# Patient Record
Sex: MALE | Race: BLACK OR AFRICAN AMERICAN | NOT HISPANIC OR LATINO | Employment: UNEMPLOYED | URBAN - METROPOLITAN AREA
[De-identification: names, ages, dates, MRNs, and addresses within clinical notes are randomized per-mention and may not be internally consistent; named-entity substitution may affect disease eponyms.]

---

## 2017-01-13 ENCOUNTER — HOSPITAL ENCOUNTER (EMERGENCY)
Facility: HOSPITAL | Age: 37
Discharge: HOME | End: 2017-01-13
Payer: MEDICAID

## 2017-01-13 VITALS
RESPIRATION RATE: 18 BRPM | SYSTOLIC BLOOD PRESSURE: 143 MMHG | BODY MASS INDEX: 25.48 KG/M2 | OXYGEN SATURATION: 100 % | HEIGHT: 69 IN | DIASTOLIC BLOOD PRESSURE: 72 MMHG | TEMPERATURE: 98 F | WEIGHT: 172 LBS | HEART RATE: 77 BPM

## 2017-01-13 DIAGNOSIS — S60.00XA TRAUMATIC HEMATOMA OF FINGER, INITIAL ENCOUNTER: Primary | ICD-10-CM

## 2017-01-13 DIAGNOSIS — S69.91XA THUMB INJURY, RIGHT, INITIAL ENCOUNTER: ICD-10-CM

## 2017-01-13 PROCEDURE — 99283 EMERGENCY DEPT VISIT LOW MDM: CPT

## 2017-01-13 RX ORDER — TRAMADOL HYDROCHLORIDE 50 MG/1
50 TABLET ORAL EVERY 6 HOURS PRN
Qty: 11 TABLET | Refills: 0 | Status: SHIPPED | OUTPATIENT
Start: 2017-01-13 | End: 2017-01-23

## 2017-01-13 NOTE — ED PROVIDER NOTES
SCRIBE #1 NOTE: I, Iris Martinez, am scribing for, and in the presence of, YINA Burgess. I have scribed the entire note.      History      Chief Complaint   Patient presents with    Hand Pain     pt c/o right thumb pain for the past few days; may have injured it while using a car arabella        Review of patient's allergies indicates:  No Known Allergies     HPI   HPI    1/13/2017, 5:35 PM   History obtained from the patient      History of Present Illness: Zbigniew Cox is a 36 y.o. male patient who presents to the Emergency Department for hand pain which onset suddenly a few days ago. Pain is located to the right thumb. Pt states that pain onset after hitting his finger on a car arabella. Sx have been constant and moderate in severity. No modifying factors noted. No associated sx included. Pt denies any fever, chills, or paresthesias. No further complaints at this time.       Arrival mode: Personal vehicle      PCP: Primary Doctor No       Past Medical History:  Past Medical History   Diagnosis Date    Substance abuse        Past Surgical History:  History reviewed. No pertinent past surgical history.      Family History:  Family History   Problem Relation Age of Onset    Stroke Maternal Uncle     Cancer Maternal Grandfather        Social History:  Social History     Social History Main Topics    Smoking status: Current Every Day Smoker     Packs/day: 1.00     Years: 10.00     Types: Cigarettes    Smokeless tobacco: Never Used    Alcohol use Yes      Comment: social    Drug use: Yes     Special: Marijuana    Sexual activity: Yes     Partners: Female       ROS   Review of Systems   Constitutional: Negative for chills, diaphoresis and fever.   HENT: Negative for sore throat.    Respiratory: Negative for shortness of breath.    Cardiovascular: Negative for chest pain.   Gastrointestinal: Negative for nausea and vomiting.   Genitourinary: Negative for dysuria.   Musculoskeletal: Negative for back pain.  "       (+) right thumb pain   Skin: Negative for color change and rash.   Neurological: Negative for weakness.        (-) paresthesias    Hematological: Does not bruise/bleed easily.       Physical Exam    Initial Vitals   BP Pulse Resp Temp SpO2   01/13/17 1600 01/13/17 1600 01/13/17 1600 01/13/17 1600 01/13/17 1600   143/72 77 18 97.9 °F (36.6 °C) 100 %      Physical Exam  Nursing Notes and Vital Signs Reviewed.  Constitutional: Patient is in no acute distress. Awake and alert. Well-developed and well-nourished.  Head: Atraumatic. Normocephalic.  Eyes: PERRL. EOM intact. Conjunctivae are not pale. No scleral icterus.  ENT: Mucous membranes are moist. Oropharynx is clear and symmetric.    Neck: Supple. Full ROM. No lymphadenopathy.  Cardiovascular: Regular rate. Regular rhythm. No murmurs, rubs, or gallops. Distal pulses are 2+ and symmetric.  Pulmonary/Chest: No respiratory distress. Clear to auscultation bilaterally. No wheezing, rales, or rhonchi.  Musculoskeletal: Moves all extremities. No obvious deformities. TTP at the right 1st MCP with notable swelling. Good capillary refill. Limited ROM to right thumb.   Skin: Warm and dry.  Neurological:  Alert, awake, and appropriate. 3/5 strength in right thumb. Normal speech.  No acute focal neurological deficits are appreciated.  Psychiatric: Normal affect. Good eye contact. Appropriate in content.      ED Course    Procedures  ED Vital Signs:  Vitals:    01/13/17 1600   BP: (!) 143/72   Pulse: 77   Resp: 18   Temp: 97.9 °F (36.6 °C)   TempSrc: Oral   SpO2: 100%   Weight: 78 kg (172 lb)   Height: 5' 9" (1.753 m)           Imaging Results:  Imaging Results         X-Ray Finger 2 or More Views Right (Final result) Result time:  01/13/17 18:21:40    Final result by Patel Pitt MD (Timothy) (01/13/17 18:21:40)    Impression:         No acute bony changes      Electronically signed by: PATEL PITT MD  Date:     01/13/17  Time:    18:21     Narrative:    Right thumb, " 3 views    Clinical History:  Right thumb pain    Findings:     There is  no acute bony or joint abnormality. No acute fracture or dislocation.                      The Emergency Provider reviewed the vital signs and test results, which are outlined above.    ED Discussion     6:32 PM: Reassessed pt at this time.Discussed with pt all pertinent ED information and results. Discussed pt dx  and plan of tx. Gave pt all f/u and return to the ED instructions. All questions and concerns were addressed at this time. Pt expresses understanding of information and instructions, and is comfortable with plan to discharge. Pt is stable for discharge.        New Prescriptions    TRAMADOL (ULTRAM) 50 MG TABLET    Take 1 tablet (50 mg total) by mouth every 6 (six) hours as needed.       Follow-up Information     Follow up with [facility] In 2 days.    Why:  As needed, If symptoms worsen return to ED     Contact information:    [facility] [facility] [address]  [phone]              Medical Decision Making    Medical Decision Making:   Clinical Tests:   Radiological Study: Ordered and Reviewed           Scribe Attestation:   Scribe #1: I performed the above scribed service and the documentation accurately describes the services I performed. I attest to the accuracy of the note.    Attending:   Physician Attestation Statement for Scribe #1: I, YINA Burgess, personally performed the services described in this documentation, as scribed by Iris Martinez, in my presence, and it is both accurate and complete.          Clinical Impression       ICD-10-CM ICD-9-CM   1. Traumatic hematoma of finger, initial encounter S60.00XA 923.3   2. Thumb injury, right, initial encounter S69.91XA 959.5       Disposition:   Disposition: Discharged  Condition: Stable         YINA Burciaga  01/13/17 8764

## 2017-01-13 NOTE — ED AVS SNAPSHOT
[facility]  [address]  [facility] [address]               Zbigniew Sneed Kenny   2017  5:32 PM   ED    Description:  Male : 1980   Department:  [facility]            Your Care was Coordinated By:     Provider Role From To    YINA Burciaga Physician Assistant 17 [phone] --      Reason for Visit     Hand Pain           Diagnoses this Visit        Comments    Traumatic hematoma of finger, initial encounter    -  Primary     Thumb injury, right, initial encounter           ED Disposition     None           To Do List           Follow-up Information     Follow up with [facility] In 2 days.    Why:  As needed, If symptoms worsen return to ED     Contact information:    [address] [facility] [address]  [phone]         These Medications        Disp Refills Start End    tramadol (ULTRAM) 50 mg tablet 11 tablet 0 2017    Take 1 tablet (50 mg total) by mouth every 6 (six) hours as needed. - Oral    Pharmacy: [facility] [address] [place] [address] Ph #: [phone]         [facility]     [facility] -  Assistance  Registered nurses in the [facility] provide clinical advisement, health education, appointment booking, and other advisory services.  Call for this free service at [phone].             Medications           Message regarding Medications     Verify the changes and/or additions to your medication regime listed below are the same as discussed with your clinician today.  If any of these changes or additions are incorrect, please notify your healthcare provider.        START taking these NEW medications        Refills    tramadol (ULTRAM) 50 mg tablet 0    Sig: Take 1 tablet (50 mg total) by mouth every 6 (six) hours as needed.    Class: Print    Route: Oral           Verify that the  "below list of medications is an accurate representation of the medications you are currently taking.  If none reported, the list may be blank. If incorrect, please contact your healthcare provider. Carry this list with you in case of emergency.           Current Medications     fluticasone (FLONASE) 50 mcg/actuation nasal spray 2 sprays by Each Nare route once daily.    ibuprofen (ADVIL,MOTRIN) 800 MG tablet Take 1 tablet (800 mg total) by mouth 3 (three) times daily.    oxycodone-acetaminophen (PERCOCET)  mg per tablet Take 0.5-1 tablets by mouth every 4 (four) hours as needed.    tramadol (ULTRAM) 50 mg tablet Take 1 tablet (50 mg total) by mouth every 6 (six) hours as needed.           Clinical Reference Information           Your Vitals Were     BP Pulse Temp Resp Height Weight    143/72 (BP Location: Right arm, Patient Position: Sitting) 77 97.9 °F (36.6 °C) (Oral) 18 5' 9" (1.753 m) 78 kg (172 lb)    SpO2 BMI             100% 25.4 kg/m2         Allergies as of 1/13/2017     No Known Allergies      Immunizations Administered on Date of Encounter - 1/13/2017     None      ED Micro, Lab, POCT     None      ED Imaging Orders     Start Ordered       Status Ordering Provider    01/13/17 1737 01/13/17 1736  X-Ray Finger 2 or More Views Right  1 time imaging      Final result         Discharge Instructions         Understanding Bone Bruise (Bone Contusion)  A bone bruise is an injury to a bone that is less severe than a bone fracture. Bone bruises are fairly common. They can happen to people of all ages. Any type of bone in your body can get a bone bruise. Other injuries often happen along with a bone bruise, such as damage to nearby ligaments.  What happens when a bone is bruised?  Bone is made of different kinds of tissue. The periosteum is a thin layer of tissue that covers most of a bone. Where bones come together, there is usually a layer of cartilage at the edges. The bone here is called subchondral bone. " Deep inside the bone is an area called the medulla. It contains the bone marrow and fibrous tissue called trabeculae.  With a bone fracture, all of the trabeculae in a region of bone have broken. But with a bone bruise, an injury only damages some of these trabeculae. An injury might cause blood to build up in the area beneath the periosteum. This causes a subperiosteal hematoma, a type of bone bruise. An injury might also cause bleeding and swelling in the area between your cartilage and the bone beneath it. This causes a subchondral bone bruise. Or bleeding and swelling can occur in the medulla of your bone. This is called an interosseous bone bruise.  What causes a bone bruise?  Injury of any kind can cause a bone bruise. Sports injuries, motor vehicle accidents, or falls from a height can cause them. Twisting injuries that cause joint sprains can also cause a bone bruise. Health conditions like arthritis may also lead to a bone bruise. This is because arthritis causes bone surfaces to grind against each other. Child abuse is another cause of bone bruises.  Symptoms of a bone bruise  Symptoms of a bone bruise can include:  · Pain and soreness in the injured area  · Swelling in the area and soft tissues around it  · Change in color of the injured area  · Swelling or stiffness of an injured joint  This pain is often more severe and lasts longer than a soft tissue injury. How severe your symptoms are and how long they last depends on how severe the bone bruise is.  Diagnosing a bone bruise  Your health care provider will ask you about your medical history and symptoms. He or she will ask how you got your injury. Your provider will examine the injured area to check for pain, bruising, and swelling. After the exam, your health care provider may be able to tell if you have a bone bruise.  A bone bruise doesnt show up on an X-ray. But you may be given an X-ray to rule out a bone fracture. A fracture may need a  different kind of treatment. An MRI can confirm a bone bruise. But your health care provider will likely only give you an MRI if your symptoms dont get better.  © 6051-2140 [employer], [email or web address]. [address] [place] [address], [place] [address]. All rights reserved. This information is not intended as a substitute for professional medical care. Always follow your healthcare professional's instructions.          [facility] Sign-Up     Activating your [facility] account is as easy as 1-2-3!     1) Visit [email or web address], select Sign Up Now, enter this activation code and your date of birth, then select Next.  [email or web address]  Expires: 2/27/2017  6:32 PM      2) Create a username and password to use when you visit [facility] in the future and select a security question in case you lose your password and select Next.    3) Enter your e-mail address and click Sign Up!    Additional Information  If you have questions, please e-mail [email or web address] or call [phone] to talk to our [facility] staff. Remember, [facility] is NOT to be used for urgent needs. For medical emergencies, dial 911.         Smoking Cessation     If you would like to quit smoking:   You may be eligible for free services if you are a [place] resident and started smoking cigarettes before September 1, 1988.  Call the Smoking Cessation Trust (SCT) toll free at (272) 624-0295 or (927) 380-7712.   Call 1-800-QUIT-NOW if you do not meet the above criteria.             [facility] complies with applicable Federal civil rights laws and does not discriminate on the basis of race, color, national origin, age, disability, or sex.        Language Assistance Services     ATTENTION: Language assistance services are available, free of charge. Please call [phone].      ATENCIÓN: Si julietala elie, tiene a gutierrez disposición servicios gratuitos de asistencia lingüística. Llame al [phone].     CHÚ Ý: N?u b?n nói Ti?ng Vi?t, có các d?ch v?  h? tr? tanvir hill? mi?n phí dành cho b?n. G?i s? [phone].

## 2017-01-14 NOTE — DISCHARGE INSTRUCTIONS
Understanding Bone Bruise (Bone Contusion)  A bone bruise is an injury to a bone that is less severe than a bone fracture. Bone bruises are fairly common. They can happen to people of all ages. Any type of bone in your body can get a bone bruise. Other injuries often happen along with a bone bruise, such as damage to nearby ligaments.  What happens when a bone is bruised?  Bone is made of different kinds of tissue. The periosteum is a thin layer of tissue that covers most of a bone. Where bones come together, there is usually a layer of cartilage at the edges. The bone here is called subchondral bone. Deep inside the bone is an area called the medulla. It contains the bone marrow and fibrous tissue called trabeculae.  With a bone fracture, all of the trabeculae in a region of bone have broken. But with a bone bruise, an injury only damages some of these trabeculae. An injury might cause blood to build up in the area beneath the periosteum. This causes a subperiosteal hematoma, a type of bone bruise. An injury might also cause bleeding and swelling in the area between your cartilage and the bone beneath it. This causes a subchondral bone bruise. Or bleeding and swelling can occur in the medulla of your bone. This is called an interosseous bone bruise.  What causes a bone bruise?  Injury of any kind can cause a bone bruise. Sports injuries, motor vehicle accidents, or falls from a height can cause them. Twisting injuries that cause joint sprains can also cause a bone bruise. Health conditions like arthritis may also lead to a bone bruise. This is because arthritis causes bone surfaces to grind against each other. Child abuse is another cause of bone bruises.  Symptoms of a bone bruise  Symptoms of a bone bruise can include:  · Pain and soreness in the injured area  · Swelling in the area and soft tissues around it  · Change in color of the injured area  · Swelling or stiffness of an injured joint  This pain is often  more severe and lasts longer than a soft tissue injury. How severe your symptoms are and how long they last depends on how severe the bone bruise is.  Diagnosing a bone bruise  Your health care provider will ask you about your medical history and symptoms. He or she will ask how you got your injury. Your provider will examine the injured area to check for pain, bruising, and swelling. After the exam, your health care provider may be able to tell if you have a bone bruise.  A bone bruise doesnt show up on an X-ray. But you may be given an X-ray to rule out a bone fracture. A fracture may need a different kind of treatment. An MRI can confirm a bone bruise. But your health care provider will likely only give you an MRI if your symptoms dont get better.  © 4179-8553 [employer], [employer]. [address] [place] [address], [place] [address]. All rights reserved. This information is not intended as a substitute for professional medical care. Always follow your healthcare professional's instructions.

## 2017-04-20 ENCOUNTER — HOSPITAL ENCOUNTER (EMERGENCY)
Facility: HOSPITAL | Age: 37
Discharge: HOME | End: 2017-04-20
Attending: EMERGENCY MEDICINE
Payer: MEDICAID

## 2017-04-20 VITALS
SYSTOLIC BLOOD PRESSURE: 122 MMHG | DIASTOLIC BLOOD PRESSURE: 68 MMHG | TEMPERATURE: 99 F | OXYGEN SATURATION: 98 % | RESPIRATION RATE: 19 BRPM | HEIGHT: 69 IN | HEART RATE: 70 BPM | BODY MASS INDEX: 23.99 KG/M2 | WEIGHT: 162 LBS

## 2017-04-20 DIAGNOSIS — K02.9 DENTAL CARIES: Primary | ICD-10-CM

## 2017-04-20 PROCEDURE — 99283 EMERGENCY DEPT VISIT LOW MDM: CPT

## 2017-04-20 RX ORDER — PENICILLIN V POTASSIUM 500 MG/1
500 TABLET, FILM COATED ORAL EVERY 6 HOURS
Qty: 28 TABLET | Refills: 0 | Status: SHIPPED | OUTPATIENT
Start: 2017-04-20

## 2017-04-20 RX ORDER — NAPROXEN 500 MG/1
500 TABLET ORAL 2 TIMES DAILY WITH MEALS
Qty: 30 TABLET | Refills: 0 | Status: SHIPPED | OUTPATIENT
Start: 2017-04-20

## 2017-04-20 RX ORDER — HYDROCODONE BITARTRATE AND ACETAMINOPHEN 10; 325 MG/1; MG/1
1 TABLET ORAL EVERY 4 HOURS PRN
Qty: 11 TABLET | Refills: 0 | Status: SHIPPED | OUTPATIENT
Start: 2017-04-20 | End: 2019-05-07

## 2017-04-20 NOTE — ED PROVIDER NOTES
"SCRIBE #1 NOTE: I, Ej Cox, am scribing for, and in the presence of, No att. providers found. I have scribed the entire note.      History      Chief Complaint   Patient presents with    Dental Pain     x 2 days, reports pain worsens with eating       Review of patient's allergies indicates:  No Known Allergies     HPI   HPI    4/20/2017, 7:02 PM   History obtained from the patient      History of Present Illness: Zbigniew Cox is a 36 y.o. male patient who presents to the Emergency Department for dental pain which onset gradually 2 days ago. Symptoms are constant and moderate in severity. Pt reports pain is located to the right lower side of his mouth. Pt states "his silver cap fell off." No mitigating or exacerbating factors reported. No associated symptoms reported. Patient denies any drooling, facial swelling, mouth sores, sore throat, trouble swallowing, chest pain, SOB, headache, light-headedness and all other sxs at this time. No further complaints or concerns at this time.     Arrival mode: Personal vehicle      PCP: Primary Doctor No       Past Medical History:  Past Medical History:   Diagnosis Date    Substance abuse        Past Surgical History:  No past surgical history on file.      Family History:  Family History   Problem Relation Age of Onset    Stroke Maternal Uncle     Cancer Maternal Grandfather        Social History:  Social History     Social History Main Topics    Smoking status: Current Every Day Smoker     Packs/day: 1.00     Years: 10.00     Types: Cigarettes    Smokeless tobacco: Never Used    Alcohol use Yes      Comment: social    Drug use: Yes     Special: Marijuana    Sexual activity: Yes     Partners: Female       ROS   Review of Systems   Constitutional: Negative for fever.   HENT: Positive for dental problem. Negative for drooling, facial swelling, mouth sores, sore throat and trouble swallowing.    Respiratory: Negative for shortness of breath.  " "  Cardiovascular: Negative for chest pain.   Gastrointestinal: Negative for diarrhea, nausea and vomiting.   Genitourinary: Negative for dysuria.   Musculoskeletal: Negative for back pain.   Skin: Negative for rash.   Neurological: Negative for weakness, light-headedness and headaches.   Hematological: Does not bruise/bleed easily.       Physical Exam    Initial Vitals   BP Pulse Resp Temp SpO2   04/20/17 1847 04/20/17 1847 04/20/17 1847 04/20/17 1847 04/20/17 1847   122/68 70 19 98.8 °F (37.1 °C) 98 %      Physical Exam  Nursing Notes and Vital Signs Reviewed.  Constitutional: Patient is in no apparent distress. Awake and alert. Well-developed and well-nourished.  Head: Atraumatic. Normocephalic.  Eyes: PERRL. EOM intact. Conjunctivae are not pale. No scleral icterus.  ENT: Mucous membranes are moist. Oropharynx is clear and symmetric. Severe right first lower molar decay noted. Missing second molar and third tooth is decaying. Poor dentition. No gum swelling or erythema appreciated. Pt is able to handle secretions.  Neck: Supple. Full ROM. No lymphadenopathy.  Cardiovascular: Regular rate. Regular rhythm. No murmurs, rubs, or gallops. Distal pulses are 2+ and symmetric.  Pulmonary/Chest: No respiratory distress. Clear to auscultation bilaterally. No wheezing, rales, or rhonchi.  Abdominal: Soft and non-distended.  There is no tenderness.  No rebound, guarding, or rigidity. Good bowel sounds.  Musculoskeletal: Moves all extremities. No obvious deformities. No edema. No calf tenderness.  Skin: Warm and dry.  Neurological:  Alert, awake, and appropriate.  Normal speech.  No acute focal neurological deficits are appreciated.  Psychiatric: Normal affect. Good eye contact. Appropriate in content.    ED Course    Procedures  ED Vital Signs:  Vitals:    04/20/17 1847   BP: 122/68   Pulse: 70   Resp: 19   Temp: 98.8 °F (37.1 °C)   TempSrc: Oral   SpO2: 98%   Weight: 73.5 kg (162 lb)   Height: 5' 9" (1.753 m)       Abnormal " Lab Results:  Labs Reviewed - No data to display            The Emergency Provider reviewed the vital signs and test results, which are outlined above.    ED Discussion     7:25 PM: Discussed with pt all pertinent ED information. Discussed pt dx and plan of tx. Gave pt all f/u and return to the ED instructions. All questions and concerns were addressed at this time. Pt expresses understanding of information and instructions, and is comfortable with plan to discharge. Pt is stable for discharge.          ED Medication(s):  Medications - No data to display    Discharge Medication List as of 4/20/2017  7:42 PM      START taking these medications    Details   hydrocodone-acetaminophen 10-325mg (NORCO)  mg Tab Take 1 tablet by mouth every 4 (four) hours as needed., Starting 4/20/2017, Until Discontinued, Print      naproxen (NAPROSYN) 500 MG tablet Take 1 tablet (500 mg total) by mouth 2 (two) times daily with meals., Starting 4/20/2017, Until Discontinued, Print      penicillin v potassium (VEETID) 500 MG tablet Take 1 tablet (500 mg total) by mouth every 6 (six) hours., Starting 4/20/2017, Until Discontinued, Print             Follow-up Information     Follow up with Primary Doctor No In 1 week(s).        Follow up with  a dentist In 1 week.        Follow up with [facility].    Specialty:  Emergency Medicine    Why:  If symptoms worsen, Or worsening condition or any other major concern    Contact information:    [address]  [facility] [address] [phone]            Medical Decision Making              Scribe Attestation:   Scribe #1: I performed the above scribed service and the documentation accurately describes the services I performed. I attest to the accuracy of the note.    Attending:   Physician Attestation Statement for Scribe #1: I, Ej Cox MD, personally performed the services described in this documentation, as scribed by Norma Reddy, in my  presence, and it is both accurate and complete.          Clinical Impression       ICD-10-CM ICD-9-CM   1. Dental caries K02.9 521.00       Disposition:   Disposition: Discharged  Condition: Stable         Ej Cox MD  04/22/17 7734

## 2017-04-20 NOTE — ED AVS SNAPSHOT
[facility] - [address] [address]               Zbigniew Sneed Kenny   2017  6:51 PM   ED    Description:  Male : 1980   Department:  [facility]           Your Care was Coordinated By:     Provider Role From To    Ej Cox MD Attending Provider 17 [phone] --      Reason for Visit     Dental Pain           Diagnoses this Visit        Comments    Dental caries    -  Primary       ED Disposition     ED Disposition Condition Comment    Discharge             To Do List           Follow-up Information     Follow up with Primary Doctor No In 1 week(s).        Follow up with  a dentist In 1 week.        Follow up with [facility].    Specialty:  Emergency Medicine    Why:  If symptoms worsen, Or worsening condition or any other major concern    Contact information:    [address] [place] [address] [phone]       These Medications        Disp Refills Start End    penicillin v potassium (VEETID) 500 MG tablet 28 tablet 0 2017     Take 1 tablet (500 mg total) by mouth every 6 (six) hours. - Oral    Pharmacy: [facility] [employer] [address] Ph #: [phone]       hydrocodone-acetaminophen 10-325mg (NORCO)  mg Tab 11 tablet 0 2017     Take 1 tablet by mouth every 4 (four) hours as needed. - Oral    Pharmacy: [facility] [employer] [address] Ph #: [phone]       naproxen (NAPROSYN) 500 MG tablet 30 tablet 0 2017     Take 1 tablet (500 mg total) by mouth 2 (two) times daily with meals. - Oral    Pharmacy: [facility] [employer] [address] Ph #: [phone]         [facility]     [facility] Care Line -  Assistance  Unless otherwise directed by your provider, please  contact [facility], our nurse care line that is available for 24/7 assistance.     Registered nurses in the [facility] provide: appointment scheduling, clinical advisement, health education, and other advisory services.  Call: [phone] (toll free)               Medications           Message regarding Medications     Verify the changes and/or additions to your medication regime listed below are the same as discussed with your clinician today.  If any of these changes or additions are incorrect, please notify your healthcare provider.        START taking these NEW medications        Refills    penicillin v potassium (VEETID) 500 MG tablet 0    Sig: Take 1 tablet (500 mg total) by mouth every 6 (six) hours.    Class: Print    Route: Oral    hydrocodone-acetaminophen 10-325mg (NORCO)  mg Tab 0    Sig: Take 1 tablet by mouth every 4 (four) hours as needed.    Class: Print    Route: Oral    naproxen (NAPROSYN) 500 MG tablet 0    Sig: Take 1 tablet (500 mg total) by mouth 2 (two) times daily with meals.    Class: Print    Route: Oral           Verify that the below list of medications is an accurate representation of the medications you are currently taking.  If none reported, the list may be blank. If incorrect, please contact your healthcare provider. Carry this list with you in case of emergency.           Current Medications     fluticasone (FLONASE) 50 mcg/actuation nasal spray 2 sprays by Each Nare route once daily.    hydrocodone-acetaminophen 10-325mg (NORCO)  mg Tab Take 1 tablet by mouth every 4 (four) hours as needed.    ibuprofen (ADVIL,MOTRIN) 800 MG tablet Take 1 tablet (800 mg total) by mouth 3 (three) times daily.    naproxen (NAPROSYN) 500 MG tablet Take 1 tablet (500 mg total) by mouth 2 (two) times daily with meals.    oxycodone-acetaminophen (PERCOCET)  mg per tablet Take 0.5-1 tablets by mouth every 4 (four) hours as needed.    penicillin v potassium (VEETID)  "500 MG tablet Take 1 tablet (500 mg total) by mouth every 6 (six) hours.           Clinical Reference Information           Your Vitals Were     BP Pulse Temp Resp Height Weight    122/68 (BP Location: Right arm, Patient Position: Sitting) 70 98.8 °F (37.1 °C) (Oral) 19 5' 9" (1.753 m) 73.5 kg (162 lb)    SpO2 BMI             98% 23.92 kg/m2         Allergies as of 4/20/2017     No Known Allergies      Immunizations Administered on Date of Encounter - 4/20/2017     None      ED Micro, Lab, POCT     None      ED Imaging Orders     None        Discharge Instructions           Dental Cavity    A dental cavity is a pit or crater in the surface of a tooth. This exposes the sensitive inner layer of the tooth and causes pain. If the cavity isnt treated, it will get bigger. It may enter the pulp and cause an infection or abscess in the bone at the root end (apex) of the tooth. An infection in the tooth is a much more serious problem than a cavity. If the tooth gets infected, you will need a root canal or the entire tooth taken out (extraction).  The pain in your tooth may be made worse by eating sweets or drinking hot or cold beverages. It may spread from the tooth to your ear or the area of your jaw on the same side.  Home care  Follow these tips when caring for yourself at home:  · Avoid sweets and hot and cold foods and drinks. Your tooth may be sensitive to changes in temperature.  · If your tooth is chipped or cracked, or if there is a large open cavity, put oil of cloves directly on the tooth to relieve pain. You can buy oil of cloves at drugstores. Some pharmacies carry an over-the-counter "toothache kit." This contains a paste that you can put on the exposed tooth to make it less sensitive.  · Put a cold pack on your jaw over the sore area to help reduce pain.  · You may use over-the-counter medicine to ease pain, unless another medicine was prescribed. If you have chronic liver or kidney disease, talk with your " healthcare provider before using acetaminophen or ibuprofen. Also talk with your provider if youve had a stomach ulcer or GI bleeding.  · If you have signs of an infection, you will be given an antibiotic. Take it as directed.  Follow-up care  Follow up with your dentist, or as advised. Your pain may go away with the treatment given today. But only a dentist can fully look at and treat this problem to prevent further tooth damage.  Call 911  Call 911 if any of these occur:  · Difficulty swallowing or breathing  · Weakness or fainting  · Unusual drowsiness  · Headache or stiff neck  When to seek medical advice  Call your healthcare provider right away if any of these occur:  · Redness or swelling of the face  · Pain gets worse or spreads to your neck  · Fever of 100.5 °F (38°C) or higher, or as directed by your healthcare provider  · Pus drains from the tooth or gum  Date Last Reviewed: 10/1/2016  © 2539-8805 [employer]. [address] [place] [address]. All rights reserved. This information is not intended as a substitute for professional medical care. Always follow your healthcare professional's instructions.          [facility] Sign-Up     Activating your [facility] account is as easy as 1-2-3!     1) Visit [email or web address], select Sign Up Now, enter this activation code and your date of birth, then select Next.  [email or web address]  Expires: 6/4/2017  7:42 PM      2) Create a username and password to use when you visit [facility] in the future and select a security question in case you lose your password and select Next.    3) Enter your e-mail address and click Sign Up!    Additional Information  If you have questions, please e-mail [email or web address] or call [phone] to talk to our [facility] staff. Remember, [facility] is NOT to be used for urgent needs. For medical emergencies, dial 911.         Smoking Cessation     If you would like to quit smoking:   You may be eligible for free  services if you are a [place] resident and started smoking cigarettes before September 1, 1988.  Call the Smoking Cessation Trust (SCT) toll free at (144) 625-7768 or (346) 877-3765.   Call 1-800-QUIT-NOW if you do not meet the above criteria.   Contact us via email: [email or web address]   View our website for more information: [email or web address]         [facility]  complies with applicable Federal civil rights laws and does not discriminate on the basis of race, color, national origin, age, disability, or sex.        Language Assistance Services     ATTENTION: Language assistance services are available, free of charge. Please call [phone].      ATENCIÓN: Si habla elie, tiene a gutierrez disposición servicios gratuitos de asistencia lingüística. Llame al [phone].     CHÚ Ý: N?u b?n nói Ti?ng Vi?t, có các d?ch v? h? tr? ngôn ng? mi?n phí dành cho b?n. G?i s? [phone].

## 2017-04-21 NOTE — DISCHARGE INSTRUCTIONS
"    Dental Cavity    A dental cavity is a pit or crater in the surface of a tooth. This exposes the sensitive inner layer of the tooth and causes pain. If the cavity isnt treated, it will get bigger. It may enter the pulp and cause an infection or abscess in the bone at the root end (apex) of the tooth. An infection in the tooth is a much more serious problem than a cavity. If the tooth gets infected, you will need a root canal or the entire tooth taken out (extraction).  The pain in your tooth may be made worse by eating sweets or drinking hot or cold beverages. It may spread from the tooth to your ear or the area of your jaw on the same side.  Home care  Follow these tips when caring for yourself at home:  · Avoid sweets and hot and cold foods and drinks. Your tooth may be sensitive to changes in temperature.  · If your tooth is chipped or cracked, or if there is a large open cavity, put oil of cloves directly on the tooth to relieve pain. You can buy oil of cloves at drugstores. Some pharmacies carry an over-the-counter "toothache kit." This contains a paste that you can put on the exposed tooth to make it less sensitive.  · Put a cold pack on your jaw over the sore area to help reduce pain.  · You may use over-the-counter medicine to ease pain, unless another medicine was prescribed. If you have chronic liver or kidney disease, talk with your healthcare provider before using acetaminophen or ibuprofen. Also talk with your provider if youve had a stomach ulcer or GI bleeding.  · If you have signs of an infection, you will be given an antibiotic. Take it as directed.  Follow-up care  Follow up with your dentist, or as advised. Your pain may go away with the treatment given today. But only a dentist can fully look at and treat this problem to prevent further tooth damage.  Call 911  Call 911 if any of these occur:  · Difficulty swallowing or breathing  · Weakness or fainting  · Unusual drowsiness  · Headache or " stiff neck  When to seek medical advice  Call your healthcare provider right away if any of these occur:  · Redness or swelling of the face  · Pain gets worse or spreads to your neck  · Fever of 100.5 °F (38°C) or higher, or as directed by your healthcare provider  · Pus drains from the tooth or gum  Date Last Reviewed: 10/1/2016  © 0956-5072 [employer]. [address] [place] [address], [place] [address]. All rights reserved. This information is not intended as a substitute for professional medical care. Always follow your healthcare professional's instructions.

## 2018-03-19 DIAGNOSIS — Z20.6 HIV EXPOSURE: Primary | ICD-10-CM

## 2019-05-01 ENCOUNTER — HOSPITAL ENCOUNTER (EMERGENCY)
Facility: HOSPITAL | Age: 39
Discharge: HOME | End: 2019-05-02
Attending: EMERGENCY MEDICINE
Payer: COMMERCIAL
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
## 2019-05-01 DIAGNOSIS — R10.9 ABDOMINAL PAIN, UNSPECIFIED ABDOMINAL LOCATION: ICD-10-CM

## 2019-05-01 LAB
ALBUMIN SERPL BCP-MCNC: 4.2 G/DL (ref 3.5–5.2)
ALP SERPL-CCNC: 109 U/L (ref 55–135)
ALT SERPL W/O P-5'-P-CCNC: 6 U/L (ref 10–44)
AMPHET+METHAMPHET UR QL: NEGATIVE
ANION GAP SERPL CALC-SCNC: 11 MMOL/L (ref 8–16)
AST SERPL-CCNC: 41 U/L (ref 10–40)
BARBITURATES UR QL SCN>200 NG/ML: NEGATIVE
BASOPHILS # BLD AUTO: 0.01 K/UL (ref 0–0.2)
BASOPHILS NFR BLD: 0.1 % (ref 0–1.9)
BENZODIAZ UR QL SCN>200 NG/ML: NEGATIVE
BILIRUB SERPL-MCNC: 1.1 MG/DL (ref 0.1–1)
BILIRUB UR QL STRIP: NEGATIVE
BUN SERPL-MCNC: 10 MG/DL (ref 6–20)
BZE UR QL SCN: NEGATIVE
CALCIUM SERPL-MCNC: 9.5 MG/DL (ref 8.7–10.5)
CANNABINOIDS UR QL SCN: NORMAL
CHLORIDE SERPL-SCNC: 105 MMOL/L (ref 95–110)
CLARITY UR: CLEAR
CO2 SERPL-SCNC: 24 MMOL/L (ref 23–29)
COLOR UR: YELLOW
CREAT SERPL-MCNC: 1 MG/DL (ref 0.5–1.4)
CREAT UR-MCNC: 333.8 MG/DL (ref 23–375)
DIFFERENTIAL METHOD: ABNORMAL
EOSINOPHIL # BLD AUTO: 0 K/UL (ref 0–0.5)
EOSINOPHIL NFR BLD: 0.1 % (ref 0–8)
ERYTHROCYTE [DISTWIDTH] IN BLOOD BY AUTOMATED COUNT: 13.4 % (ref 11.5–14.5)
EST. GFR  (AFRICAN AMERICAN): >60 ML/MIN/1.73 M^2
EST. GFR  (NON AFRICAN AMERICAN): >60 ML/MIN/1.73 M^2
ETHANOL SERPL-MCNC: <10 MG/DL
GLUCOSE SERPL-MCNC: 101 MG/DL (ref 70–110)
GLUCOSE UR QL STRIP: NEGATIVE
HCT VFR BLD AUTO: 44.3 % (ref 40–54)
HGB BLD-MCNC: 15 G/DL (ref 14–18)
HGB UR QL STRIP: ABNORMAL
KETONES UR QL STRIP: NEGATIVE
LEUKOCYTE ESTERASE UR QL STRIP: NEGATIVE
LYMPHOCYTES # BLD AUTO: 1.3 K/UL (ref 1–4.8)
LYMPHOCYTES NFR BLD: 13.8 % (ref 18–48)
MCH RBC QN AUTO: 31.5 PG (ref 27–31)
MCHC RBC AUTO-ENTMCNC: 33.9 G/DL (ref 32–36)
MCV RBC AUTO: 93 FL (ref 82–98)
METHADONE UR QL SCN>300 NG/ML: NEGATIVE
MONOCYTES # BLD AUTO: 0.6 K/UL (ref 0.3–1)
MONOCYTES NFR BLD: 5.9 % (ref 4–15)
NEUTROPHILS # BLD AUTO: 7.4 K/UL (ref 1.8–7.7)
NEUTROPHILS NFR BLD: 80.1 % (ref 38–73)
NITRITE UR QL STRIP: NEGATIVE
OPIATES UR QL SCN: NORMAL
PCP UR QL SCN>25 NG/ML: NEGATIVE
PH UR STRIP: 6 [PH] (ref 5–8)
PLATELET # BLD AUTO: 168 K/UL (ref 150–350)
PMV BLD AUTO: 9.5 FL (ref 9.2–12.9)
POTASSIUM SERPL-SCNC: 4.1 MMOL/L (ref 3.5–5.1)
PROT SERPL-MCNC: 7.5 G/DL (ref 6–8.4)
PROT UR QL STRIP: ABNORMAL
RBC # BLD AUTO: 4.76 M/UL (ref 4.6–6.2)
SODIUM SERPL-SCNC: 140 MMOL/L (ref 136–145)
SP GR UR STRIP: >=1.03 (ref 1–1.03)
TOXICOLOGY INFORMATION: NORMAL
URN SPEC COLLECT METH UR: ABNORMAL
UROBILINOGEN UR STRIP-ACNC: NEGATIVE EU/DL
WBC # BLD AUTO: 9.25 K/UL (ref 3.9–12.7)

## 2019-05-01 PROCEDURE — 81003 URINALYSIS AUTO W/O SCOPE: CPT | Mod: 59

## 2019-05-01 PROCEDURE — 85025 COMPLETE CBC W/AUTO DIFF WBC: CPT

## 2019-05-01 PROCEDURE — 36415 COLL VENOUS BLD VENIPUNCTURE: CPT

## 2019-05-01 PROCEDURE — 99285 EMERGENCY DEPT VISIT HI MDM: CPT

## 2019-05-01 PROCEDURE — 80307 DRUG TEST PRSMV CHEM ANLYZR: CPT

## 2019-05-01 PROCEDURE — 80320 DRUG SCREEN QUANTALCOHOLS: CPT

## 2019-05-01 PROCEDURE — 80053 COMPREHEN METABOLIC PANEL: CPT

## 2019-05-02 VITALS
RESPIRATION RATE: 11 BRPM | BODY MASS INDEX: 22.3 KG/M2 | TEMPERATURE: 98 F | WEIGHT: 150.56 LBS | OXYGEN SATURATION: 93 % | HEIGHT: 69 IN | HEART RATE: 83 BPM | DIASTOLIC BLOOD PRESSURE: 89 MMHG | SYSTOLIC BLOOD PRESSURE: 142 MMHG

## 2019-05-02 PROCEDURE — 25500020 PHARM REV CODE 255: Performed by: EMERGENCY MEDICINE

## 2019-05-02 RX ORDER — NAPROXEN 500 MG/1
500 TABLET ORAL 2 TIMES DAILY WITH MEALS
Qty: 60 TABLET | Refills: 0 | Status: SHIPPED | OUTPATIENT
Start: 2019-05-02

## 2019-05-02 RX ADMIN — IOHEXOL 75 ML: 350 INJECTION, SOLUTION INTRAVENOUS at 12:05
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
## 2019-05-02 NOTE — ED NOTES
Patient sitting up in bed, no acute distress noted, awake, alert, and oriented x 3, calm, respirations even and unlabored, and skin is warm and dry.Patient denies needs at this time. Side rails up x 2, call light in reach, bed low and locked. Will continue to monitor.

## 2019-05-07 ENCOUNTER — HOSPITAL ENCOUNTER (EMERGENCY)
Facility: HOSPITAL | Age: 39
Discharge: HOME | End: 2019-05-08
Attending: EMERGENCY MEDICINE
Payer: COMMERCIAL

## 2019-05-07 VITALS
RESPIRATION RATE: 16 BRPM | BODY MASS INDEX: 23.25 KG/M2 | TEMPERATURE: 98 F | OXYGEN SATURATION: 100 % | WEIGHT: 156.94 LBS | DIASTOLIC BLOOD PRESSURE: 107 MMHG | HEIGHT: 69 IN | SYSTOLIC BLOOD PRESSURE: 169 MMHG | HEART RATE: 69 BPM

## 2019-05-07 DIAGNOSIS — R07.89 CHEST WALL PAIN: ICD-10-CM

## 2019-05-07 DIAGNOSIS — S22.43XD MULTIPLE CLOSED FRACTURES OF RIBS OF BOTH SIDES WITH ROUTINE HEALING, SUBSEQUENT ENCOUNTER: Primary | ICD-10-CM

## 2019-05-07 PROCEDURE — 99283 EMERGENCY DEPT VISIT LOW MDM: CPT | Mod: 25

## 2019-05-07 RX ORDER — HYDROCODONE BITARTRATE AND ACETAMINOPHEN 7.5; 325 MG/1; MG/1
1 TABLET ORAL EVERY 6 HOURS PRN
Qty: 12 TABLET | Refills: 0 | Status: SHIPPED | OUTPATIENT
Start: 2019-05-07
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
## 2023-01-14 ENCOUNTER — HOSPITAL ENCOUNTER (EMERGENCY)
Facility: HOSPITAL | Age: 43
Discharge: HOME | End: 2023-01-14
Attending: FAMILY MEDICINE
Payer: MEDICAID

## 2023-01-14 VITALS
WEIGHT: 182 LBS | BODY MASS INDEX: 25.48 KG/M2 | SYSTOLIC BLOOD PRESSURE: 141 MMHG | HEART RATE: 97 BPM | RESPIRATION RATE: 14 BRPM | DIASTOLIC BLOOD PRESSURE: 89 MMHG | OXYGEN SATURATION: 96 % | HEIGHT: 71 IN | TEMPERATURE: 98 F

## 2023-01-14 DIAGNOSIS — T50.901A DRUG OVERDOSE: ICD-10-CM

## 2023-01-14 DIAGNOSIS — T50.901A ACCIDENTAL DRUG OVERDOSE, INITIAL ENCOUNTER: Primary | ICD-10-CM

## 2023-01-14 LAB
ALBUMIN SERPL BCP-MCNC: 3.6 G/DL (ref 3.5–5.2)
ALP SERPL-CCNC: 106 U/L (ref 55–135)
ALT SERPL W/O P-5'-P-CCNC: <5 U/L (ref 10–44)
ANION GAP SERPL CALC-SCNC: 12 MMOL/L (ref 8–16)
AST SERPL-CCNC: 21 U/L (ref 10–40)
BASOPHILS # BLD AUTO: 0.01 K/UL (ref 0–0.2)
BASOPHILS NFR BLD: 0.2 % (ref 0–1.9)
BILIRUB SERPL-MCNC: 0.6 MG/DL (ref 0.1–1)
BUN SERPL-MCNC: 9 MG/DL (ref 6–20)
CALCIUM SERPL-MCNC: 8.7 MG/DL (ref 8.7–10.5)
CHLORIDE SERPL-SCNC: 110 MMOL/L (ref 95–110)
CO2 SERPL-SCNC: 24 MMOL/L (ref 23–29)
CREAT SERPL-MCNC: 1 MG/DL (ref 0.5–1.4)
DIFFERENTIAL METHOD: ABNORMAL
EOSINOPHIL # BLD AUTO: 0.1 K/UL (ref 0–0.5)
EOSINOPHIL NFR BLD: 1.3 % (ref 0–8)
ERYTHROCYTE [DISTWIDTH] IN BLOOD BY AUTOMATED COUNT: 13.1 % (ref 11.5–14.5)
EST. GFR  (NO RACE VARIABLE): >60 ML/MIN/1.73 M^2
GLUCOSE SERPL-MCNC: 137 MG/DL (ref 70–110)
HCT VFR BLD AUTO: 43.1 % (ref 40–54)
HGB BLD-MCNC: 14.4 G/DL (ref 14–18)
IMM GRANULOCYTES # BLD AUTO: 0.01 K/UL (ref 0–0.04)
IMM GRANULOCYTES NFR BLD AUTO: 0.2 % (ref 0–0.5)
LYMPHOCYTES # BLD AUTO: 2.6 K/UL (ref 1–4.8)
LYMPHOCYTES NFR BLD: 49.2 % (ref 18–48)
MCH RBC QN AUTO: 31.4 PG (ref 27–31)
MCHC RBC AUTO-ENTMCNC: 33.4 G/DL (ref 32–36)
MCV RBC AUTO: 94 FL (ref 82–98)
MONOCYTES # BLD AUTO: 0.3 K/UL (ref 0.3–1)
MONOCYTES NFR BLD: 4.9 % (ref 4–15)
NEUTROPHILS # BLD AUTO: 2.3 K/UL (ref 1.8–7.7)
NEUTROPHILS NFR BLD: 44.2 % (ref 38–73)
NRBC BLD-RTO: 0 /100 WBC
PLATELET # BLD AUTO: 185 K/UL (ref 150–450)
PMV BLD AUTO: 9.9 FL (ref 9.2–12.9)
POTASSIUM SERPL-SCNC: 3.4 MMOL/L (ref 3.5–5.1)
PROT SERPL-MCNC: 6.6 G/DL (ref 6–8.4)
RBC # BLD AUTO: 4.58 M/UL (ref 4.6–6.2)
SODIUM SERPL-SCNC: 146 MMOL/L (ref 136–145)
WBC # BLD AUTO: 5.3 K/UL (ref 3.9–12.7)

## 2023-01-14 PROCEDURE — 93005 ELECTROCARDIOGRAM TRACING: CPT

## 2023-01-14 PROCEDURE — 93010 EKG 12-LEAD: ICD-10-PCS | Mod: ,,, | Performed by: INTERNAL MEDICINE

## 2023-01-14 PROCEDURE — 99284 EMERGENCY DEPT VISIT MOD MDM: CPT

## 2023-01-14 PROCEDURE — 85025 COMPLETE CBC W/AUTO DIFF WBC: CPT | Performed by: FAMILY MEDICINE

## 2023-01-14 PROCEDURE — 93010 ELECTROCARDIOGRAM REPORT: CPT | Mod: ,,, | Performed by: INTERNAL MEDICINE

## 2023-01-14 PROCEDURE — 80053 COMPREHEN METABOLIC PANEL: CPT | Performed by: FAMILY MEDICINE

## 2023-01-14 NOTE — ED PROVIDER NOTES
SCRIBE #1 NOTE: I, Sharon Ramirez, am scribing for, and in the presence of, Charlene Valverde MD. I have scribed the entire note.       History     Chief Complaint   Patient presents with    Drug Overdose     Pt states he took a pill not knowing what it was, narcan with + response, pt AAO      Review of patient's allergies indicates:  No Known Allergies      History of Present Illness     HPI    1/14/2023, 4:44 PM  History obtained from the patient      History of Present Illness: Zbigniew Cox is a 42 y.o. male patient with a PMHx of substance abuse who presents to the Emergency Department for evaluation after a drug overdose this afternoon. Per EMS, pt was unconscious upon their arrival and had a positive response to narcan. Pt states he took a pill and did not know what it was. Symptoms are constant and moderate in severity. No mitigating or exacerbating factors reported. No other sxs reported. Patient denies any fever, chills, N/V/D, and all other sxs at this time. No prior tx reported. No further complaints or concerns at this time.       Arrival mode: Ambulance Service    PCP: Primary Doctor No        Past Medical History:  Past Medical History:   Diagnosis Date    Substance abuse        Past Surgical History:  No past surgical history on file.      Family History:  Family History   Problem Relation Age of Onset    Stroke Maternal Uncle     Cancer Maternal Grandfather        Social History:  Social History     Tobacco Use    Smoking status: Every Day     Packs/day: 1.00     Years: 10.00     Pack years: 10.00     Types: Cigarettes    Smokeless tobacco: Never   Substance and Sexual Activity    Alcohol use: Yes     Comment: social    Drug use: Yes     Types: Marijuana    Sexual activity: Yes     Partners: Female        Review of Systems     Review of Systems   Constitutional:  Negative for chills and fever.   HENT:  Negative for congestion and sore throat.    Eyes:  Negative for pain and redness.  "  Respiratory:  Negative for cough and shortness of breath.    Cardiovascular:  Negative for chest pain and palpitations.   Gastrointestinal:  Negative for diarrhea, nausea and vomiting.   Genitourinary:  Negative for dysuria and hematuria.   Musculoskeletal:  Negative for back pain and neck pain.   Skin:  Negative for rash and wound.   Neurological:  Negative for weakness and numbness.   Psychiatric/Behavioral:  Negative for agitation and suicidal ideas.    All other systems reviewed and are negative.     Physical Exam     Initial Vitals [01/14/23 1632]   BP Pulse Resp Temp SpO2   (!) 152/98 96 18 98.3 °F (36.8 °C) 96 %      MAP       --          Physical Exam  Nursing Notes and Vital Signs Reviewed.  Constitutional: Patient is in no acute distress. Well-developed and well-nourished.  Head: Atraumatic. Normocephalic.  Eyes:  EOM intact. Conjunctivae are not pale. No scleral icterus.  ENT: Mucous membranes are moist. Oropharynx is clear and symmetric.    Neck: Supple. Full ROM.   Cardiovascular: Regular rate. Regular rhythm. No murmurs, rubs, or gallops. Distal pulses are 2+ and symmetric.  Pulmonary/Chest: No respiratory distress. Clear to auscultation bilaterally. No wheezing or rales.  Abdominal: Soft and non-distended.  There is no tenderness.  No rebound, guarding, or rigidity.   Musculoskeletal: Moves all extremities. No obvious deformities. No edema.   Skin: Warm and dry.  Neurological:  Alert, awake, and appropriate.  Normal speech.  No acute focal neurological deficits are appreciated.  Psychiatric: Normal affect. Good eye contact. Appropriate in content.     ED Course   Procedures  ED Vital Signs:  Vitals:    01/14/23 1632 01/14/23 1700 01/14/23 1730   BP: (!) 152/98 (!) 153/97 (!) 141/89   Pulse: 96 89 97   Resp: 18 15 14   Temp: 98.3 °F (36.8 °C)  98.3 °F (36.8 °C)   TempSrc: Oral  Oral   SpO2: 96% 96% 96%   Weight: 82.6 kg (182 lb)     Height: 5' 11" (1.803 m)         Abnormal Lab Results:  Labs " Reviewed   CBC W/ AUTO DIFFERENTIAL - Abnormal; Notable for the following components:       Result Value    RBC 4.58 (*)     MCH 31.4 (*)     Lymph % 49.2 (*)     All other components within normal limits   COMPREHENSIVE METABOLIC PANEL - Abnormal; Notable for the following components:    Sodium 146 (*)     Potassium 3.4 (*)     Glucose 137 (*)     ALT <5 (*)     All other components within normal limits        All Lab Results:  Results for orders placed or performed during the hospital encounter of 01/14/23   CBC auto differential   Result Value Ref Range    WBC 5.30 3.90 - 12.70 K/uL    RBC 4.58 (L) 4.60 - 6.20 M/uL    Hemoglobin 14.4 14.0 - 18.0 g/dL    Hematocrit 43.1 40.0 - 54.0 %    MCV 94 82 - 98 fL    MCH 31.4 (H) 27.0 - 31.0 pg    MCHC 33.4 32.0 - 36.0 g/dL    RDW 13.1 11.5 - 14.5 %    Platelets 185 150 - 450 K/uL    MPV 9.9 9.2 - 12.9 fL    Immature Granulocytes 0.2 0.0 - 0.5 %    Gran # (ANC) 2.3 1.8 - 7.7 K/uL    Immature Grans (Abs) 0.01 0.00 - 0.04 K/uL    Lymph # 2.6 1.0 - 4.8 K/uL    Mono # 0.3 0.3 - 1.0 K/uL    Eos # 0.1 0.0 - 0.5 K/uL    Baso # 0.01 0.00 - 0.20 K/uL    nRBC 0 0 /100 WBC    Gran % 44.2 38.0 - 73.0 %    Lymph % 49.2 (H) 18.0 - 48.0 %    Mono % 4.9 4.0 - 15.0 %    Eosinophil % 1.3 0.0 - 8.0 %    Basophil % 0.2 0.0 - 1.9 %    Differential Method Automated    Comprehensive metabolic panel   Result Value Ref Range    Sodium 146 (H) 136 - 145 mmol/L    Potassium 3.4 (L) 3.5 - 5.1 mmol/L    Chloride 110 95 - 110 mmol/L    CO2 24 23 - 29 mmol/L    Glucose 137 (H) 70 - 110 mg/dL    BUN 9 6 - 20 mg/dL    Creatinine 1.0 0.5 - 1.4 mg/dL    Calcium 8.7 8.7 - 10.5 mg/dL    Total Protein 6.6 6.0 - 8.4 g/dL    Albumin 3.6 3.5 - 5.2 g/dL    Total Bilirubin 0.6 0.1 - 1.0 mg/dL    Alkaline Phosphatase 106 55 - 135 U/L    AST 21 10 - 40 U/L    ALT <5 (L) 10 - 44 U/L    Anion Gap 12 8 - 16 mmol/L    eGFR >60 >60 mL/min/1.73 m^2         Imaging Results:  Imaging Results    None          The EKG was  ordered, reviewed, and independently interpreted by the ED provider.  Interpretation time: 16:45  Rate: 87 BPM  Rhythm: normal sinus rhythm  Interpretation: Normal ECG. No STEMI.           The Emergency Provider reviewed the vital signs and test results, which are outlined above.     ED Discussion       5:19 PM: Reassessed pt at this time.  Discussed with pt all pertinent ED information and results. Discussed pt dx and plan of tx. Gave pt all f/u and return to the ED instructions. All questions and concerns were addressed at this time. Pt expresses understanding of information and instructions, and is comfortable with plan to discharge. Pt is stable for discharge.    I discussed with patient and/or family/caretaker that evaluation in the ED does not suggest any emergent or life threatening medical conditions requiring immediate intervention beyond what was provided in the ED, and I believe patient is safe for discharge.  Regardless, an unremarkable evaluation in the ED does not preclude the development or presence of a serious of life threatening condition. As such, patient was instructed to return immediately for any worsening or change in current symptoms.         Medical Decision Making:   Clinical Tests:   Lab Tests: Ordered and Reviewed  Medical Tests: Ordered and Reviewed         ED Medication(s):  Medications - No data to display    Discharge Medication List as of 1/14/2023  5:13 PM                  Scribe Attestation:   Scribe #1: I performed the above scribed service and the documentation accurately describes the services I performed. I attest to the accuracy of the note.     Attending:   Physician Attestation Statement for Scribe #1: I, Charlene Valverde MD, personally performed the services described in this documentation, as scribed by Sharon Ramirez, in my presence, and it is both accurate and complete.           Clinical Impression       ICD-10-CM ICD-9-CM   1. Accidental drug overdose, initial  encounter  T50.901A 977.9     E858.9   2. Drug overdose  T50.901A 977.9     E980.5       Disposition:   Disposition: Discharged  Condition: Stable       Charlene Valverde MD  01/17/23 222

## 2023-12-13 ENCOUNTER — HOSPITAL ENCOUNTER (EMERGENCY)
Facility: HOSPITAL | Age: 43
Discharge: HOME | End: 2023-12-13
Attending: EMERGENCY MEDICINE
Payer: MEDICAID

## 2023-12-13 VITALS
OXYGEN SATURATION: 100 % | DIASTOLIC BLOOD PRESSURE: 83 MMHG | HEART RATE: 60 BPM | TEMPERATURE: 99 F | BODY MASS INDEX: 23.01 KG/M2 | RESPIRATION RATE: 16 BRPM | WEIGHT: 165 LBS | SYSTOLIC BLOOD PRESSURE: 150 MMHG

## 2023-12-13 DIAGNOSIS — R10.84 GENERALIZED ABDOMINAL PAIN: Primary | ICD-10-CM

## 2023-12-13 DIAGNOSIS — K59.00 CONSTIPATION: ICD-10-CM

## 2023-12-13 LAB
ALBUMIN SERPL BCP-MCNC: 4.4 G/DL (ref 3.5–5.2)
ALP SERPL-CCNC: 82 U/L (ref 55–135)
ALT SERPL W/O P-5'-P-CCNC: 5 U/L (ref 10–44)
ANION GAP SERPL CALC-SCNC: 8 MMOL/L (ref 8–16)
AST SERPL-CCNC: 25 U/L (ref 10–40)
BACTERIA #/AREA URNS HPF: ABNORMAL /HPF
BASOPHILS # BLD AUTO: 0.02 K/UL (ref 0–0.2)
BASOPHILS NFR BLD: 0.4 % (ref 0–1.9)
BILIRUB SERPL-MCNC: 1.6 MG/DL (ref 0.1–1)
BILIRUB UR QL STRIP: NEGATIVE
BUN SERPL-MCNC: 9 MG/DL (ref 6–20)
CALCIUM SERPL-MCNC: 9.9 MG/DL (ref 8.7–10.5)
CHLORIDE SERPL-SCNC: 110 MMOL/L (ref 95–110)
CLARITY UR: CLEAR
CO2 SERPL-SCNC: 26 MMOL/L (ref 23–29)
COLOR UR: YELLOW
CREAT SERPL-MCNC: 0.9 MG/DL (ref 0.5–1.4)
DIFFERENTIAL METHOD: ABNORMAL
EOSINOPHIL # BLD AUTO: 0 K/UL (ref 0–0.5)
EOSINOPHIL NFR BLD: 0.6 % (ref 0–8)
ERYTHROCYTE [DISTWIDTH] IN BLOOD BY AUTOMATED COUNT: 12.3 % (ref 11.5–14.5)
EST. GFR  (NO RACE VARIABLE): >60 ML/MIN/1.73 M^2
GLUCOSE SERPL-MCNC: 82 MG/DL (ref 70–110)
GLUCOSE UR QL STRIP: NEGATIVE
HCT VFR BLD AUTO: 49.4 % (ref 40–54)
HGB BLD-MCNC: 16.8 G/DL (ref 14–18)
HGB UR QL STRIP: NEGATIVE
HYALINE CASTS #/AREA URNS LPF: 3 /LPF
IMM GRANULOCYTES # BLD AUTO: 0.01 K/UL (ref 0–0.04)
IMM GRANULOCYTES NFR BLD AUTO: 0.2 % (ref 0–0.5)
KETONES UR QL STRIP: ABNORMAL
LEUKOCYTE ESTERASE UR QL STRIP: NEGATIVE
LIPASE SERPL-CCNC: 39 U/L (ref 4–60)
LYMPHOCYTES # BLD AUTO: 2.1 K/UL (ref 1–4.8)
LYMPHOCYTES NFR BLD: 43.6 % (ref 18–48)
MCH RBC QN AUTO: 32.9 PG (ref 27–31)
MCHC RBC AUTO-ENTMCNC: 34 G/DL (ref 32–36)
MCV RBC AUTO: 97 FL (ref 82–98)
MICROSCOPIC COMMENT: ABNORMAL
MONOCYTES # BLD AUTO: 0.4 K/UL (ref 0.3–1)
MONOCYTES NFR BLD: 7.2 % (ref 4–15)
NEUTROPHILS # BLD AUTO: 2.3 K/UL (ref 1.8–7.7)
NEUTROPHILS NFR BLD: 48 % (ref 38–73)
NITRITE UR QL STRIP: NEGATIVE
NRBC BLD-RTO: 0 /100 WBC
PH UR STRIP: 6 [PH] (ref 5–8)
PLATELET # BLD AUTO: 180 K/UL (ref 150–450)
PMV BLD AUTO: 9.5 FL (ref 9.2–12.9)
POTASSIUM SERPL-SCNC: 4.3 MMOL/L (ref 3.5–5.1)
PROT SERPL-MCNC: 7.9 G/DL (ref 6–8.4)
PROT UR QL STRIP: ABNORMAL
RBC # BLD AUTO: 5.11 M/UL (ref 4.6–6.2)
RBC #/AREA URNS HPF: 1 /HPF (ref 0–4)
SODIUM SERPL-SCNC: 144 MMOL/L (ref 136–145)
SP GR UR STRIP: >1.03 (ref 1–1.03)
URN SPEC COLLECT METH UR: ABNORMAL
UROBILINOGEN UR STRIP-ACNC: ABNORMAL EU/DL
WBC # BLD AUTO: 4.88 K/UL (ref 3.9–12.7)
WBC #/AREA URNS HPF: 2 /HPF (ref 0–5)

## 2023-12-13 PROCEDURE — 99284 EMERGENCY DEPT VISIT MOD MDM: CPT | Mod: 25

## 2023-12-13 PROCEDURE — 81000 URINALYSIS NONAUTO W/SCOPE: CPT

## 2023-12-13 PROCEDURE — 80053 COMPREHEN METABOLIC PANEL: CPT

## 2023-12-13 PROCEDURE — 96375 TX/PRO/DX INJ NEW DRUG ADDON: CPT

## 2023-12-13 PROCEDURE — 85025 COMPLETE CBC W/AUTO DIFF WBC: CPT

## 2023-12-13 PROCEDURE — 96361 HYDRATE IV INFUSION ADD-ON: CPT

## 2023-12-13 PROCEDURE — 83690 ASSAY OF LIPASE: CPT

## 2023-12-13 PROCEDURE — 25000003 PHARM REV CODE 250

## 2023-12-13 PROCEDURE — 96374 THER/PROPH/DIAG INJ IV PUSH: CPT

## 2023-12-13 PROCEDURE — 63600175 PHARM REV CODE 636 W HCPCS

## 2023-12-13 RX ORDER — FAMOTIDINE 20 MG/1
20 TABLET, FILM COATED ORAL 2 TIMES DAILY
Qty: 20 TABLET | Refills: 0 | Status: SHIPPED | OUTPATIENT
Start: 2023-12-13 | End: 2023-12-23

## 2023-12-13 RX ORDER — POLYETHYLENE GLYCOL 3350 17 G/17G
17 POWDER, FOR SOLUTION ORAL DAILY
Qty: 30 EACH | Refills: 0 | Status: SHIPPED | OUTPATIENT
Start: 2023-12-13 | End: 2023-12-13 | Stop reason: SDUPTHER

## 2023-12-13 RX ORDER — KETOROLAC TROMETHAMINE 30 MG/ML
15 INJECTION, SOLUTION INTRAMUSCULAR; INTRAVENOUS
Status: COMPLETED | OUTPATIENT
Start: 2023-12-13 | End: 2023-12-13

## 2023-12-13 RX ORDER — FAMOTIDINE 20 MG/1
20 TABLET, FILM COATED ORAL 2 TIMES DAILY
Qty: 20 TABLET | Refills: 0 | Status: SHIPPED | OUTPATIENT
Start: 2023-12-13 | End: 2023-12-13 | Stop reason: SDUPTHER

## 2023-12-13 RX ORDER — FAMOTIDINE 10 MG/ML
20 INJECTION INTRAVENOUS
Status: COMPLETED | OUTPATIENT
Start: 2023-12-13 | End: 2023-12-13

## 2023-12-13 RX ORDER — SUCRALFATE 1 G/1
1 TABLET ORAL 4 TIMES DAILY
Qty: 40 TABLET | Refills: 0 | Status: SHIPPED | OUTPATIENT
Start: 2023-12-13 | End: 2023-12-13 | Stop reason: SDUPTHER

## 2023-12-13 RX ORDER — SUCRALFATE 1 G/1
1 TABLET ORAL 4 TIMES DAILY
Qty: 40 TABLET | Refills: 0 | Status: SHIPPED | OUTPATIENT
Start: 2023-12-13 | End: 2023-12-23

## 2023-12-13 RX ORDER — MAG HYDROX/ALUMINUM HYD/SIMETH 200-200-20
30 SUSPENSION, ORAL (FINAL DOSE FORM) ORAL
Status: COMPLETED | OUTPATIENT
Start: 2023-12-13 | End: 2023-12-13

## 2023-12-13 RX ORDER — POLYETHYLENE GLYCOL 3350 17 G/17G
17 POWDER, FOR SOLUTION ORAL DAILY
Qty: 30 EACH | Refills: 0 | Status: SHIPPED | OUTPATIENT
Start: 2023-12-13 | End: 2024-01-12

## 2023-12-13 RX ADMIN — SODIUM CHLORIDE, POTASSIUM CHLORIDE, SODIUM LACTATE AND CALCIUM CHLORIDE 1000 ML: 600; 310; 30; 20 INJECTION, SOLUTION INTRAVENOUS at 09:12

## 2023-12-13 RX ADMIN — FAMOTIDINE 20 MG: 10 INJECTION INTRAVENOUS at 09:12

## 2023-12-13 RX ADMIN — ALUMINUM HYDROXIDE, MAGNESIUM HYDROXIDE, AND DIMETHICONE 30 ML: 200; 20; 200 SUSPENSION ORAL at 09:12

## 2023-12-13 RX ADMIN — KETOROLAC TROMETHAMINE 15 MG: 30 INJECTION, SOLUTION INTRAMUSCULAR; INTRAVENOUS at 09:12

## 2023-12-13 NOTE — Clinical Note
"Zbigniew"Chilo Cox was seen and treated in our emergency department on 12/13/2023.  He may return to work on 12/14/2023.       If you have any questions or concerns, please don't hesitate to call.      Gene Yoder PA-C"

## 2023-12-14 NOTE — ED PROVIDER NOTES
Encounter Date: 12/13/2023    SCRIBE #1 NOTE: I, Patrizia Rowegiovanni Koroma, am scribing for, and in the presence of,  Gene Yoder PA-C. I have scribed the following portions of the note - Other sections scribed: HPI, ROS, PE.       History     Chief Complaint   Patient presents with    Abdominal Pain     To left upper and lower quad with last BM a week ago    Constipation     Patient is a 43 y. o. female with no pertinent PMHx presents to the ED for a chief complaint of generalized abdominal pain onset a week ago. Patient reports associated Sx of constipation and nausea. No attempted Tx for the Sx. No other alleviating or exacerbating factors. Denies Hx of cholelithiasis, pancreatitis, gastric ulcers, acid reflux, or abdominal surgeries. Also denies STD concerns, and notes that he has 3 partners in the past year but has used protection. Further denies any dysuria, hematuria, penile discharge, testicular pain, fever, chills, vomiting, or other associated symptoms. NKDA. LBM: a week ago.  Denies history of EGD or colonoscopy.    The history is provided by the patient. No  was used.     Review of patient's allergies indicates:  No Known Allergies  Past Medical History:   Diagnosis Date    Substance abuse      History reviewed. No pertinent surgical history.  Family History   Problem Relation Age of Onset    Stroke Maternal Uncle     Cancer Maternal Grandfather      Social History     Tobacco Use    Smoking status: Every Day     Current packs/day: 1.00     Average packs/day: 1 pack/day for 10.0 years (10.0 ttl pk-yrs)     Types: Cigarettes    Smokeless tobacco: Never   Substance Use Topics    Alcohol use: Yes     Comment: social    Drug use: Yes     Types: Marijuana     Review of Systems   Constitutional:  Negative for chills and fever.   HENT:  Negative for congestion, ear pain, rhinorrhea, sore throat and trouble swallowing.    Respiratory:  Negative for cough and shortness of breath.     Cardiovascular:  Negative for chest pain.   Gastrointestinal:  Positive for abdominal pain (generalized), constipation and nausea. Negative for blood in stool, diarrhea and vomiting.   Genitourinary:  Negative for dysuria, flank pain, frequency, hematuria, penile discharge, penile pain and testicular pain.   Musculoskeletal:  Negative for neck pain and neck stiffness.   Neurological:  Negative for dizziness, numbness and headaches.       Physical Exam     Initial Vitals [12/13/23 2025]   BP Pulse Resp Temp SpO2   (!) 141/87 (!) 57 16 97.9 °F (36.6 °C) 100 %      MAP       --         Physical Exam    Nursing note and vitals reviewed.  Constitutional: He appears well-developed and well-nourished.   HENT:   Head: Normocephalic and atraumatic.   Right Ear: External ear normal.   Left Ear: External ear normal.   Neck: Carotid bruit is not present.   Normal range of motion.  Cardiovascular:  Normal rate, regular rhythm, normal heart sounds and intact distal pulses.     Exam reveals no gallop and no friction rub.       No murmur heard.  Pulmonary/Chest: Breath sounds normal. No respiratory distress. He has no wheezes. He has no rhonchi. He has no rales.   Abdominal: Abdomen is soft. Bowel sounds are normal. He exhibits no distension. There is generalized abdominal tenderness. There is no rebound and no guarding.   Musculoskeletal:         General: Normal range of motion.      Cervical back: Normal range of motion.     Neurological: He is alert and oriented to person, place, and time. GCS score is 15. GCS eye subscore is 4. GCS verbal subscore is 5. GCS motor subscore is 6.   Psychiatric: He has a normal mood and affect.         ED Course   Procedures  Labs Reviewed   CBC W/ AUTO DIFFERENTIAL - Abnormal; Notable for the following components:       Result Value    MCH 32.9 (*)     All other components within normal limits   COMPREHENSIVE METABOLIC PANEL - Abnormal; Notable for the following components:    Total Bilirubin  1.6 (*)     ALT 5 (*)     All other components within normal limits   URINALYSIS, REFLEX TO URINE CULTURE - Abnormal; Notable for the following components:    Specific Gravity, UA >1.030 (*)     Protein, UA 1+ (*)     Ketones, UA Trace (*)     Urobilinogen, UA 4.0-6.0 (*)     All other components within normal limits    Narrative:     Specimen Source->Urine   URINALYSIS MICROSCOPIC - Abnormal; Notable for the following components:    Hyaline Casts, UA 3 (*)     All other components within normal limits    Narrative:     Specimen Source->Urine   LIPASE          Imaging Results              X-Ray Abdomen Flat And Erect (Final result)  Result time 12/13/23 20:47:59      Final result by Dmitri Irving MD (12/13/23 20:47:59)                   Impression:      Nonobstructive bowel gas pattern.      Electronically signed by: Dmitri Irving MD  Date:    12/13/2023  Time:    20:47               Narrative:    EXAMINATION:  XR ABDOMEN FLAT AND ERECT    CLINICAL HISTORY:  Constipation, unspecified    TECHNIQUE:  Flat and erect AP views of the abdomen were performed.    COMPARISON:  None    FINDINGS:  Nonspecific bowel gas pattern.  No evidence to suggest obstruction.  No free air identified.  Scattered stool is seen in the colon.  Partially visualized lung bases are clear.                                       Medications   lactated ringers bolus 1,000 mL (0 mLs Intravenous Stopped 12/13/23 2233)   aluminum-magnesium hydroxide-simethicone 200-200-20 mg/5 mL suspension 30 mL (30 mLs Oral Given 12/13/23 2133)   famotidine (PF) injection 20 mg (20 mg Intravenous Given 12/13/23 2134)   ketorolac injection 15 mg (15 mg Intravenous Given 12/13/23 2134)     Medical Decision Making  This is an emergent evaluation of a 43-year-old male who presents to the emergency department for evaluation of nausea and generalized abdominal pain x 1 week.  Physical exam reveals generalized abdominal tenderness.  Abdomen is soft, non distended,  normal bowel sounds. Regular rate rhythm without murmurs.  No carotid bruits appreciated on exam. Lungs are clear to auscultation bilaterally.  Differential diagnosis includes but is not limited to GERD/gastritis, appendicitis, cholecystitis, pancreatitis, peptic ulcer disease, bowel obstruction, functional constipation.  Workup initiated with basic labs, lipase, urinalysis.  X-ray of abdomen flat and erect ordered in triage prior to my evaluation of the patient.  Will hold off on any imaging at this time, patient in agreement.  Ordered GI cocktail, Pepcid, Toradol.  Ordered 1 L of lactated Ringer's.  X-ray shows nonobstructive bowel gas pattern, does show scattered stool seen in the colon.  CBC without leukocytosis or anemia.  CMP with normal renal function, no electrolyte abnormalities, total bilirubin of 1.6, ALT of 5.  Lipase within normal limits, no evidence supporting acute pancreatitis.  Urinalysis showing no signs of infection, negative for nitrites or leukocytes.  Patient reports major improvement of symptoms after medications.  Serial abdominal exam benign.  Will start on Pepcid, Carafate, Miralax.  Ambulatory referral placed to gastroenterology. Patient is very well appearing, and in no acute distress. Vital signs are reassuring here in the emergency department, patient is afebrile, breathing comfortable, satting 100 % on room air. Patient/Caregiver is stable for discharge at this time. Patient/Caregiver verbalize understanding of care plan. All questions and concerns were addressed. Discussed strict return precautions with the patient/caregiver. Instructed follow up with primary care provider and GI within 1 week.      Gene Yoder PA-C    DISCLAIMER: This note was prepared with [employer] voice recognition transcription software. Garbled syntax, mangled pronouns, and other bizarre constructions may be attributed to that software system.     Amount and/or Complexity of Data Reviewed  Labs: ordered.  Decision-making details documented in ED Course.  Radiology: ordered. Decision-making details documented in ED Course.    Risk  OTC drugs.  Prescription drug management.            Scribe Attestation:   Scribe #1: I performed the above scribed service and the documentation accurately describes the services I performed. I attest to the accuracy of the note.                         I, Gene Yoder PA-C, personally performed the services described in this documentation. All medical record entries made by the scribe were at my direction and in my presence. I have reviewed the chart and agree that the record reflects my personal performance and is accurate and complete.        Clinical Impression:  Final diagnoses:  [K59.00] Constipation  [R10.84] Generalized abdominal pain (Primary)          ED Disposition Condition    Discharge Stable          ED Prescriptions       Medication Sig Dispense Start Date End Date Auth. Provider    sucralfate (CARAFATE) 1 gram tablet Take 1 tablet (1 g total) by mouth 4 (four) times daily. for 10 days 40 tablet 12/13/2023 12/23/2023 Gene Yoder PA-C    famotidine (PEPCID) 20 MG tablet Take 1 tablet (20 mg total) by mouth 2 (two) times daily. for 10 days 20 tablet 12/13/2023 12/23/2023 Gene Yoder PA-C    polyethylene glycol (GLYCOLAX) 17 gram PwPk Take 17 g by mouth once daily. 30 each 12/13/2023 1/12/2024 Gene Yoder PA-C          Follow-up Information       Follow up With Specialties Details Why Contact Info    [facility] - Emergency Dept Emergency Medicine Go to  As needed, If symptoms worsen, or new symptoms develop [address] [facility] [place] [id number] [phone]             Gene Yoder PA-C  12/13/23 9022

## 2023-12-14 NOTE — DISCHARGE INSTRUCTIONS

## 2024-08-14 ENCOUNTER — HOSPITAL ENCOUNTER (EMERGENCY)
Facility: HOSPITAL | Age: 44
Discharge: LEFT BEFORE DISCHARGE | End: 2024-08-14
Attending: EMERGENCY MEDICINE

## 2024-08-14 VITALS
HEART RATE: 62 BPM | SYSTOLIC BLOOD PRESSURE: 124 MMHG | TEMPERATURE: 99 F | BODY MASS INDEX: 25.18 KG/M2 | DIASTOLIC BLOOD PRESSURE: 76 MMHG | OXYGEN SATURATION: 96 % | WEIGHT: 170 LBS | HEIGHT: 69 IN | RESPIRATION RATE: 16 BRPM

## 2024-08-14 DIAGNOSIS — R93.0 ABNORMAL CT OF THE HEAD: ICD-10-CM

## 2024-08-14 DIAGNOSIS — Z53.29 LEFT AGAINST MEDICAL ADVICE: ICD-10-CM

## 2024-08-14 DIAGNOSIS — R91.1 NODULE OF RIGHT LUNG: ICD-10-CM

## 2024-08-14 DIAGNOSIS — Z72.0 TOBACCO ABUSE: ICD-10-CM

## 2024-08-14 DIAGNOSIS — M25.522 LEFT ELBOW PAIN: ICD-10-CM

## 2024-08-14 DIAGNOSIS — M89.8X2 PAIN OF RIGHT HUMERUS: ICD-10-CM

## 2024-08-14 DIAGNOSIS — J43.9: ICD-10-CM

## 2024-08-14 DIAGNOSIS — S02.2XXA CLOSED FRACTURE OF NASAL BONE, INITIAL ENCOUNTER: ICD-10-CM
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
## 2024-08-14 LAB
ABO + RH BLD: NORMAL
ALBUMIN SERPL BCP-MCNC: 3.7 G/DL (ref 3.5–5.2)
ALP SERPL-CCNC: 81 U/L (ref 55–135)
ALT SERPL W/O P-5'-P-CCNC: 5 U/L (ref 10–44)
AMPHET+METHAMPHET UR QL: NEGATIVE
ANION GAP SERPL CALC-SCNC: 10 MMOL/L (ref 8–16)
APTT PPP: 28.4 SEC (ref 21–32)
AST SERPL-CCNC: 23 U/L (ref 10–40)
BACTERIA #/AREA URNS HPF: NORMAL /HPF
BARBITURATES UR QL SCN>200 NG/ML: NEGATIVE
BASOPHILS # BLD AUTO: 0.03 K/UL (ref 0–0.2)
BASOPHILS NFR BLD: 0.4 % (ref 0–1.9)
BENZODIAZ UR QL SCN>200 NG/ML: NEGATIVE
BILIRUB SERPL-MCNC: 0.9 MG/DL (ref 0.1–1)
BILIRUB UR QL STRIP: NEGATIVE
BLD GP AB SCN CELLS X3 SERPL QL: NORMAL
BUN SERPL-MCNC: 6 MG/DL (ref 6–20)
BZE UR QL SCN: NEGATIVE
CALCIUM SERPL-MCNC: 9.5 MG/DL (ref 8.7–10.5)
CANNABINOIDS UR QL SCN: NEGATIVE
CHLORIDE SERPL-SCNC: 105 MMOL/L (ref 95–110)
CLARITY UR: CLEAR
CO2 SERPL-SCNC: 28 MMOL/L (ref 23–29)
COLOR UR: YELLOW
CREAT SERPL-MCNC: 0.9 MG/DL (ref 0.5–1.4)
CREAT UR-MCNC: 130.8 MG/DL (ref 23–375)
DIFFERENTIAL METHOD BLD: ABNORMAL
EOSINOPHIL # BLD AUTO: 0.1 K/UL (ref 0–0.5)
EOSINOPHIL NFR BLD: 0.9 % (ref 0–8)
ERYTHROCYTE [DISTWIDTH] IN BLOOD BY AUTOMATED COUNT: 14.1 % (ref 11.5–14.5)
EST. GFR  (NO RACE VARIABLE): >60 ML/MIN/1.73 M^2
ETHANOL SERPL-MCNC: <10 MG/DL
GLUCOSE SERPL-MCNC: 80 MG/DL (ref 70–110)
GLUCOSE UR QL STRIP: NEGATIVE
HCT VFR BLD AUTO: 43 % (ref 40–54)
HGB BLD-MCNC: 14.6 G/DL (ref 14–18)
HGB UR QL STRIP: NEGATIVE
HYALINE CASTS #/AREA URNS LPF: 0 /LPF
IMM GRANULOCYTES # BLD AUTO: 0.02 K/UL (ref 0–0.04)
IMM GRANULOCYTES NFR BLD AUTO: 0.2 % (ref 0–0.5)
INR PPP: 0.9 (ref 0.8–1.2)
KETONES UR QL STRIP: NEGATIVE
LACTATE SERPL-SCNC: 1.7 MMOL/L (ref 0.5–2.2)
LEUKOCYTE ESTERASE UR QL STRIP: NEGATIVE
LYMPHOCYTES # BLD AUTO: 1.9 K/UL (ref 1–4.8)
LYMPHOCYTES NFR BLD: 21.9 % (ref 18–48)
MCH RBC QN AUTO: 32.6 PG (ref 27–31)
MCHC RBC AUTO-ENTMCNC: 34 G/DL (ref 32–36)
MCV RBC AUTO: 96 FL (ref 82–98)
METHADONE UR QL SCN>300 NG/ML: NEGATIVE
MICROSCOPIC COMMENT: NORMAL
MONOCYTES # BLD AUTO: 0.4 K/UL (ref 0.3–1)
MONOCYTES NFR BLD: 5 % (ref 4–15)
NEUTROPHILS # BLD AUTO: 6 K/UL (ref 1.8–7.7)
NEUTROPHILS NFR BLD: 71.6 % (ref 38–73)
NITRITE UR QL STRIP: NEGATIVE
NRBC BLD-RTO: 0 /100 WBC
OPIATES UR QL SCN: ABNORMAL
PCP UR QL SCN>25 NG/ML: NEGATIVE
PH UR STRIP: 8 [PH] (ref 5–8)
PLATELET # BLD AUTO: 166 K/UL (ref 150–450)
PMV BLD AUTO: 9.4 FL (ref 9.2–12.9)
POTASSIUM SERPL-SCNC: 4.7 MMOL/L (ref 3.5–5.1)
PROT SERPL-MCNC: 6.9 G/DL (ref 6–8.4)
PROT UR QL STRIP: ABNORMAL
PROTHROMBIN TIME: 10.7 SEC (ref 9–12.5)
RBC # BLD AUTO: 4.48 M/UL (ref 4.6–6.2)
RBC #/AREA URNS HPF: 0 /HPF (ref 0–4)
SODIUM SERPL-SCNC: 143 MMOL/L (ref 136–145)
SP GR UR STRIP: >1.03 (ref 1–1.03)
SPECIMEN OUTDATE: NORMAL
TOXICOLOGY INFORMATION: ABNORMAL
URN SPEC COLLECT METH UR: ABNORMAL
UROBILINOGEN UR STRIP-ACNC: NEGATIVE EU/DL
WBC # BLD AUTO: 8.44 K/UL (ref 3.9–12.7)
WBC #/AREA URNS HPF: 0 /HPF (ref 0–5)

## 2024-08-14 PROCEDURE — 99285 EMERGENCY DEPT VISIT HI MDM: CPT | Mod: 25

## 2024-08-14 PROCEDURE — 96374 THER/PROPH/DIAG INJ IV PUSH: CPT

## 2024-08-14 PROCEDURE — 90715 TDAP VACCINE 7 YRS/> IM: CPT | Performed by: EMERGENCY MEDICINE

## 2024-08-14 PROCEDURE — 86900 BLOOD TYPING SEROLOGIC ABO: CPT | Performed by: EMERGENCY MEDICINE

## 2024-08-14 PROCEDURE — 25000003 PHARM REV CODE 250: Performed by: EMERGENCY MEDICINE

## 2024-08-14 PROCEDURE — 80307 DRUG TEST PRSMV CHEM ANLYZR: CPT | Performed by: EMERGENCY MEDICINE

## 2024-08-14 PROCEDURE — 81000 URINALYSIS NONAUTO W/SCOPE: CPT | Mod: 59 | Performed by: EMERGENCY MEDICINE

## 2024-08-14 PROCEDURE — 96361 HYDRATE IV INFUSION ADD-ON: CPT

## 2024-08-14 PROCEDURE — 85025 COMPLETE CBC W/AUTO DIFF WBC: CPT | Performed by: EMERGENCY MEDICINE

## 2024-08-14 PROCEDURE — 86901 BLOOD TYPING SEROLOGIC RH(D): CPT | Performed by: EMERGENCY MEDICINE

## 2024-08-14 PROCEDURE — 85610 PROTHROMBIN TIME: CPT | Performed by: EMERGENCY MEDICINE

## 2024-08-14 PROCEDURE — 25500020 PHARM REV CODE 255: Performed by: EMERGENCY MEDICINE

## 2024-08-14 PROCEDURE — 63600175 PHARM REV CODE 636 W HCPCS: Performed by: EMERGENCY MEDICINE

## 2024-08-14 PROCEDURE — 83605 ASSAY OF LACTIC ACID: CPT | Performed by: EMERGENCY MEDICINE

## 2024-08-14 PROCEDURE — 80053 COMPREHEN METABOLIC PANEL: CPT | Performed by: EMERGENCY MEDICINE

## 2024-08-14 PROCEDURE — 82077 ASSAY SPEC XCP UR&BREATH IA: CPT | Performed by: EMERGENCY MEDICINE

## 2024-08-14 PROCEDURE — 85730 THROMBOPLASTIN TIME PARTIAL: CPT | Performed by: EMERGENCY MEDICINE

## 2024-08-14 PROCEDURE — 36415 COLL VENOUS BLD VENIPUNCTURE: CPT | Performed by: EMERGENCY MEDICINE

## 2024-08-14 PROCEDURE — 96375 TX/PRO/DX INJ NEW DRUG ADDON: CPT

## 2024-08-14 PROCEDURE — 90471 IMMUNIZATION ADMIN: CPT | Performed by: EMERGENCY MEDICINE

## 2024-08-14 PROCEDURE — 86850 RBC ANTIBODY SCREEN: CPT | Performed by: EMERGENCY MEDICINE

## 2024-08-14 RX ORDER — HYDROCODONE BITARTRATE AND ACETAMINOPHEN 5; 325 MG/1; MG/1
1 TABLET ORAL EVERY 6 HOURS PRN
Qty: 16 TABLET | Refills: 0 | Status: SHIPPED | OUTPATIENT
Start: 2024-08-14 | End: 2024-08-30

## 2024-08-14 RX ORDER — ONDANSETRON HYDROCHLORIDE 2 MG/ML
4 INJECTION, SOLUTION INTRAVENOUS ONCE
Status: COMPLETED | OUTPATIENT
Start: 2024-08-14 | End: 2024-08-14

## 2024-08-14 RX ORDER — MORPHINE SULFATE 4 MG/ML
4 INJECTION, SOLUTION INTRAMUSCULAR; INTRAVENOUS
Status: COMPLETED | OUTPATIENT
Start: 2024-08-14 | End: 2024-08-14

## 2024-08-14 RX ADMIN — TETANUS TOXOID, REDUCED DIPHTHERIA TOXOID AND ACELLULAR PERTUSSIS VACCINE, ADSORBED 0.5 ML: 5; 2.5; 8; 8; 2.5 SUSPENSION INTRAMUSCULAR at 03:08

## 2024-08-14 RX ADMIN — ONDANSETRON 4 MG: 2 INJECTION INTRAMUSCULAR; INTRAVENOUS at 03:08

## 2024-08-14 RX ADMIN — IOHEXOL 100 ML: 350 INJECTION, SOLUTION INTRAVENOUS at 04:08

## 2024-08-14 RX ADMIN — MORPHINE SULFATE 4 MG: 4 INJECTION INTRAVENOUS at 03:08

## 2024-08-14 RX ADMIN — SODIUM CHLORIDE 1000 ML: 9 INJECTION, SOLUTION INTRAVENOUS at 03:08

## 2024-08-14 NOTE — ED NOTES
Bed: 14  Expected date:   Expected time:   Means of arrival: Ambulance Service  Comments:  WHEN CLEAN
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
## 2024-08-14 NOTE — Clinical Note
"Zbigniew"Chilo Cox was seen and treated in our emergency department on 8/14/2024.  He may return to work on 08/19/2024.       If you have any questions or concerns, please don't hesitate to call.      Estelita Moya, DO"

## 2024-08-15 NOTE — DISCHARGE INSTRUCTIONS
Bullous emphysema.  11 mm nodular opacity in the right middle lobe.  Recommend follow-up in 6 months interval.  Additionally recommend correlation to smoking history and recommend annual follow-up surveillance.    Focal hyperdensity involving the posteromedial aspect the thalamus measuring up to 11 mm similar or slightly more prominent than the prior examination 05/12/2019. Differential diagnosis includes mass versus calcification versus hemorrhage. Recommend clinical correlation and follow-up.     Regarding  OPIOID PAIN MEDICATION  counseling. The patient/family was educated on the risks of using opioid pain medications.   These risks include, but not limited to:  1/50 chance of addiction, increase risk of falls/injury, gastrointestinal issues (Constipation),  or accidental death.  Patient/Family was educated on  pain medication Interactions.  Do not take any sedative medications (benadryl, ativan, xanax, Klonopin, trazadone); medicine for sleep; other pain pills (lortab, percocet), alcohol, with your narcotic prescription.  This could lead to an accidental overdose resulting in permanent disability or possible death.  Patient/Family was encouraged to use non-narcotic alternatives and to limit the use of narcotic medication.

## 2025-03-12 ENCOUNTER — TELEPHONE (OUTPATIENT)
Dept: NEUROLOGY | Facility: CLINIC | Age: 45
End: 2025-03-12
Payer: MEDICAID

## 2025-03-24 ENCOUNTER — ANESTHESIA (OUTPATIENT)
Dept: SURGERY | Facility: HOSPITAL | Age: 45
End: 2025-03-24
Payer: MEDICAID

## 2025-03-24 ENCOUNTER — ANESTHESIA EVENT (OUTPATIENT)
Dept: SURGERY | Facility: HOSPITAL | Age: 45
End: 2025-03-24
Payer: MEDICAID

## 2025-03-24 ENCOUNTER — HOSPITAL ENCOUNTER (INPATIENT)
Facility: HOSPITAL | Age: 45
LOS: 1 days | Discharge: HOME | DRG: 392 | End: 2025-03-25
Attending: EMERGENCY MEDICINE | Admitting: STUDENT IN AN ORGANIZED HEALTH CARE EDUCATION/TRAINING PROGRAM
Payer: MEDICAID

## 2025-03-24 DIAGNOSIS — K59.00 CONSTIPATION: ICD-10-CM

## 2025-03-24 DIAGNOSIS — K59.04 CHRONIC IDIOPATHIC CONSTIPATION: ICD-10-CM

## 2025-03-24 DIAGNOSIS — K56.609 LARGE BOWEL OBSTRUCTION: Primary | ICD-10-CM

## 2025-03-24 DIAGNOSIS — F11.90 HEROIN USE: ICD-10-CM

## 2025-03-24 LAB
ABSOLUTE EOSINOPHIL (OHS): 0.14 K/UL
ABSOLUTE MONOCYTE (OHS): 0.45 K/UL (ref 0.3–1)
ABSOLUTE NEUTROPHIL COUNT (OHS): 6.58 K/UL (ref 1.8–7.7)
ALBUMIN SERPL BCP-MCNC: 3.5 G/DL (ref 3.5–5.2)
ALP SERPL-CCNC: 88 UNIT/L (ref 40–150)
ALT SERPL W/O P-5'-P-CCNC: 6 UNIT/L (ref 10–44)
ANION GAP (OHS): 11 MMOL/L (ref 8–16)
AST SERPL-CCNC: 21 UNIT/L (ref 11–45)
BASOPHILS # BLD AUTO: 0.02 K/UL
BASOPHILS NFR BLD AUTO: 0.2 %
BILIRUB SERPL-MCNC: 0.6 MG/DL (ref 0.1–1)
BILIRUB UR QL STRIP.AUTO: NEGATIVE
BUN SERPL-MCNC: 11 MG/DL (ref 6–20)
CALCIUM SERPL-MCNC: 9.2 MG/DL (ref 8.7–10.5)
CHLORIDE SERPL-SCNC: 101 MMOL/L (ref 95–110)
CLARITY UR: CLEAR
CO2 SERPL-SCNC: 22 MMOL/L (ref 23–29)
COLOR UR AUTO: YELLOW
CREAT SERPL-MCNC: 0.7 MG/DL (ref 0.5–1.4)
ERYTHROCYTE [DISTWIDTH] IN BLOOD BY AUTOMATED COUNT: 19.2 % (ref 11.5–14.5)
ETHANOL SERPL-MCNC: <10 MG/DL
GFR SERPLBLD CREATININE-BSD FMLA CKD-EPI: >60 ML/MIN/1.73/M2
GLUCOSE SERPL-MCNC: 85 MG/DL (ref 70–110)
GLUCOSE UR QL STRIP: NEGATIVE
HCT VFR BLD AUTO: 32.7 % (ref 40–54)
HGB BLD-MCNC: 10.7 GM/DL (ref 14–18)
HGB UR QL STRIP: NEGATIVE
IMM GRANULOCYTES # BLD AUTO: 0.02 K/UL (ref 0–0.04)
IMM GRANULOCYTES NFR BLD AUTO: 0.2 % (ref 0–0.5)
KETONES UR QL STRIP: NEGATIVE
LEUKOCYTE ESTERASE UR QL STRIP: NEGATIVE
LIPASE SERPL-CCNC: 14 U/L (ref 4–60)
LYMPHOCYTES # BLD AUTO: 1.09 K/UL (ref 1–4.8)
MCH RBC QN AUTO: 28.5 PG (ref 27–50)
MCHC RBC AUTO-ENTMCNC: 32.7 G/DL (ref 32–36)
MCV RBC AUTO: 87 FL (ref 82–98)
NITRITE UR QL STRIP: NEGATIVE
NUCLEATED RBC (/100WBC) (OHS): 0 /100 WBC
PH UR STRIP: 6 [PH]
PLATELET # BLD AUTO: 288 K/UL (ref 150–450)
PMV BLD AUTO: 8.8 FL (ref 9.2–12.9)
POTASSIUM SERPL-SCNC: 4.8 MMOL/L (ref 3.5–5.1)
PROT SERPL-MCNC: 7.1 GM/DL (ref 6–8.4)
PROT UR QL STRIP: NEGATIVE
RBC # BLD AUTO: 3.76 M/UL (ref 4.6–6.2)
RELATIVE EOSINOPHIL (OHS): 1.7 %
RELATIVE LYMPHOCYTE (OHS): 13.1 % (ref 18–48)
RELATIVE MONOCYTE (OHS): 5.4 % (ref 4–15)
RELATIVE NEUTROPHIL (OHS): 79.4 % (ref 38–73)
SODIUM SERPL-SCNC: 134 MMOL/L (ref 136–145)
SP GR UR STRIP: 1.02
UROBILINOGEN UR STRIP-ACNC: NEGATIVE EU/DL
WBC # BLD AUTO: 8.3 K/UL (ref 3.9–12.7)

## 2025-03-24 PROCEDURE — 25000003 PHARM REV CODE 250: Performed by: STUDENT IN AN ORGANIZED HEALTH CARE EDUCATION/TRAINING PROGRAM

## 2025-03-24 PROCEDURE — 63600175 PHARM REV CODE 636 W HCPCS

## 2025-03-24 PROCEDURE — 96361 HYDRATE IV INFUSION ADD-ON: CPT

## 2025-03-24 PROCEDURE — 63600175 PHARM REV CODE 636 W HCPCS: Performed by: STUDENT IN AN ORGANIZED HEALTH CARE EDUCATION/TRAINING PROGRAM

## 2025-03-24 PROCEDURE — G0378 HOSPITAL OBSERVATION PER HR: HCPCS

## 2025-03-24 PROCEDURE — 25000003 PHARM REV CODE 250: Performed by: EMERGENCY MEDICINE

## 2025-03-24 PROCEDURE — 36000704 HC OR TIME LEV I 1ST 15 MIN: Performed by: STUDENT IN AN ORGANIZED HEALTH CARE EDUCATION/TRAINING PROGRAM

## 2025-03-24 PROCEDURE — 81003 URINALYSIS AUTO W/O SCOPE: CPT | Performed by: EMERGENCY MEDICINE

## 2025-03-24 PROCEDURE — A9698 NON-RAD CONTRAST MATERIALNOC: HCPCS | Performed by: EMERGENCY MEDICINE

## 2025-03-24 PROCEDURE — 94761 N-INVAS EAR/PLS OXIMETRY MLT: CPT

## 2025-03-24 PROCEDURE — 99223 1ST HOSP IP/OBS HIGH 75: CPT | Mod: ,,, | Performed by: STUDENT IN AN ORGANIZED HEALTH CARE EDUCATION/TRAINING PROGRAM

## 2025-03-24 PROCEDURE — 63600175 PHARM REV CODE 636 W HCPCS: Performed by: EMERGENCY MEDICINE

## 2025-03-24 PROCEDURE — 36000705 HC OR TIME LEV I EA ADD 15 MIN: Performed by: STUDENT IN AN ORGANIZED HEALTH CARE EDUCATION/TRAINING PROGRAM

## 2025-03-24 PROCEDURE — 85025 COMPLETE CBC W/AUTO DIFF WBC: CPT | Performed by: EMERGENCY MEDICINE

## 2025-03-24 PROCEDURE — 37000008 HC ANESTHESIA 1ST 15 MINUTES: Performed by: STUDENT IN AN ORGANIZED HEALTH CARE EDUCATION/TRAINING PROGRAM

## 2025-03-24 PROCEDURE — 45378 DIAGNOSTIC COLONOSCOPY: CPT | Mod: 52,,, | Performed by: STUDENT IN AN ORGANIZED HEALTH CARE EDUCATION/TRAINING PROGRAM

## 2025-03-24 PROCEDURE — 96376 TX/PRO/DX INJ SAME DRUG ADON: CPT

## 2025-03-24 PROCEDURE — 25500020 PHARM REV CODE 255: Performed by: EMERGENCY MEDICINE

## 2025-03-24 PROCEDURE — 25000003 PHARM REV CODE 250

## 2025-03-24 PROCEDURE — 83690 ASSAY OF LIPASE: CPT | Performed by: EMERGENCY MEDICINE

## 2025-03-24 PROCEDURE — 96374 THER/PROPH/DIAG INJ IV PUSH: CPT

## 2025-03-24 PROCEDURE — 96375 TX/PRO/DX INJ NEW DRUG ADDON: CPT

## 2025-03-24 PROCEDURE — 80053 COMPREHEN METABOLIC PANEL: CPT | Performed by: EMERGENCY MEDICINE

## 2025-03-24 PROCEDURE — 0DJD8ZZ INSPECTION OF LOWER INTESTINAL TRACT, VIA NATURAL OR ARTIFICIAL OPENING ENDOSCOPIC: ICD-10-PCS | Performed by: STUDENT IN AN ORGANIZED HEALTH CARE EDUCATION/TRAINING PROGRAM

## 2025-03-24 PROCEDURE — 82077 ASSAY SPEC XCP UR&BREATH IA: CPT | Performed by: EMERGENCY MEDICINE

## 2025-03-24 PROCEDURE — 37000009 HC ANESTHESIA EA ADD 15 MINS: Performed by: STUDENT IN AN ORGANIZED HEALTH CARE EDUCATION/TRAINING PROGRAM

## 2025-03-24 PROCEDURE — 99285 EMERGENCY DEPT VISIT HI MDM: CPT | Mod: 25

## 2025-03-24 PROCEDURE — 11000001 HC ACUTE MED/SURG PRIVATE ROOM

## 2025-03-24 PROCEDURE — 71000033 HC RECOVERY, INTIAL HOUR: Performed by: STUDENT IN AN ORGANIZED HEALTH CARE EDUCATION/TRAINING PROGRAM

## 2025-03-24 RX ORDER — SODIUM CHLORIDE, SODIUM LACTATE, POTASSIUM CHLORIDE, CALCIUM CHLORIDE 600; 310; 30; 20 MG/100ML; MG/100ML; MG/100ML; MG/100ML
INJECTION, SOLUTION INTRAVENOUS CONTINUOUS
Status: DISCONTINUED | OUTPATIENT
Start: 2025-03-24 | End: 2025-03-25 | Stop reason: HOSPADM

## 2025-03-24 RX ORDER — SUCCINYLCHOLINE CHLORIDE 20 MG/ML
INJECTION INTRAMUSCULAR; INTRAVENOUS
Status: DISCONTINUED | OUTPATIENT
Start: 2025-03-24 | End: 2025-03-24

## 2025-03-24 RX ORDER — PROPOFOL 10 MG/ML
VIAL (ML) INTRAVENOUS
Status: DISCONTINUED | OUTPATIENT
Start: 2025-03-24 | End: 2025-03-24

## 2025-03-24 RX ORDER — ROCURONIUM BROMIDE 10 MG/ML
INJECTION, SOLUTION INTRAVENOUS
Status: DISCONTINUED | OUTPATIENT
Start: 2025-03-24 | End: 2025-03-24

## 2025-03-24 RX ORDER — MORPHINE SULFATE 4 MG/ML
4 INJECTION, SOLUTION INTRAMUSCULAR; INTRAVENOUS EVERY 4 HOURS PRN
Refills: 0 | Status: DISCONTINUED | OUTPATIENT
Start: 2025-03-24 | End: 2025-03-24

## 2025-03-24 RX ORDER — FENTANYL CITRATE 50 UG/ML
25 INJECTION, SOLUTION INTRAMUSCULAR; INTRAVENOUS EVERY 5 MIN PRN
Status: DISCONTINUED | OUTPATIENT
Start: 2025-03-24 | End: 2025-03-24 | Stop reason: HOSPADM

## 2025-03-24 RX ORDER — AMOXICILLIN 250 MG
1 CAPSULE ORAL 2 TIMES DAILY
Status: DISCONTINUED | OUTPATIENT
Start: 2025-03-24 | End: 2025-03-25 | Stop reason: HOSPADM

## 2025-03-24 RX ORDER — LIDOCAINE HYDROCHLORIDE 20 MG/ML
INJECTION INTRAVENOUS
Status: DISCONTINUED | OUTPATIENT
Start: 2025-03-24 | End: 2025-03-24

## 2025-03-24 RX ORDER — DEXAMETHASONE SODIUM PHOSPHATE 4 MG/ML
INJECTION, SOLUTION INTRA-ARTICULAR; INTRALESIONAL; INTRAMUSCULAR; INTRAVENOUS; SOFT TISSUE
Status: DISCONTINUED | OUTPATIENT
Start: 2025-03-24 | End: 2025-03-24

## 2025-03-24 RX ORDER — ONDANSETRON HYDROCHLORIDE 2 MG/ML
4 INJECTION, SOLUTION INTRAVENOUS DAILY PRN
Status: DISCONTINUED | OUTPATIENT
Start: 2025-03-24 | End: 2025-03-24 | Stop reason: HOSPADM

## 2025-03-24 RX ORDER — OXYCODONE AND ACETAMINOPHEN 5; 325 MG/1; MG/1
1 TABLET ORAL
Status: DISCONTINUED | OUTPATIENT
Start: 2025-03-24 | End: 2025-03-24 | Stop reason: HOSPADM

## 2025-03-24 RX ORDER — ONDANSETRON HYDROCHLORIDE 2 MG/ML
4 INJECTION, SOLUTION INTRAVENOUS EVERY 8 HOURS PRN
Status: DISCONTINUED | OUTPATIENT
Start: 2025-03-24 | End: 2025-03-25 | Stop reason: HOSPADM

## 2025-03-24 RX ORDER — ONDANSETRON HYDROCHLORIDE 2 MG/ML
INJECTION, SOLUTION INTRAVENOUS
Status: DISCONTINUED | OUTPATIENT
Start: 2025-03-24 | End: 2025-03-24

## 2025-03-24 RX ORDER — FAMOTIDINE 10 MG/ML
20 INJECTION, SOLUTION INTRAVENOUS EVERY 12 HOURS
Status: DISCONTINUED | OUTPATIENT
Start: 2025-03-24 | End: 2025-03-25 | Stop reason: HOSPADM

## 2025-03-24 RX ORDER — FENTANYL CITRATE 50 UG/ML
INJECTION, SOLUTION INTRAMUSCULAR; INTRAVENOUS
Status: DISCONTINUED | OUTPATIENT
Start: 2025-03-24 | End: 2025-03-24

## 2025-03-24 RX ORDER — ONDANSETRON HYDROCHLORIDE 2 MG/ML
4 INJECTION, SOLUTION INTRAVENOUS ONCE AS NEEDED
Status: DISCONTINUED | OUTPATIENT
Start: 2025-03-24 | End: 2025-03-24 | Stop reason: HOSPADM

## 2025-03-24 RX ORDER — SODIUM CHLORIDE 0.9 % (FLUSH) 0.9 %
10 SYRINGE (ML) INJECTION
Status: DISCONTINUED | OUTPATIENT
Start: 2025-03-24 | End: 2025-03-25 | Stop reason: HOSPADM

## 2025-03-24 RX ORDER — MIDAZOLAM HYDROCHLORIDE 1 MG/ML
INJECTION INTRAMUSCULAR; INTRAVENOUS
Status: DISCONTINUED | OUTPATIENT
Start: 2025-03-24 | End: 2025-03-24

## 2025-03-24 RX ORDER — POLYETHYLENE GLYCOL 3350 17 G/17G
17 POWDER, FOR SOLUTION ORAL 3 TIMES DAILY
Status: DISCONTINUED | OUTPATIENT
Start: 2025-03-24 | End: 2025-03-25 | Stop reason: HOSPADM

## 2025-03-24 RX ORDER — MORPHINE SULFATE 4 MG/ML
4 INJECTION, SOLUTION INTRAMUSCULAR; INTRAVENOUS
Status: DISCONTINUED | OUTPATIENT
Start: 2025-03-24 | End: 2025-03-25 | Stop reason: HOSPADM

## 2025-03-24 RX ORDER — DEXMEDETOMIDINE HYDROCHLORIDE 100 UG/ML
INJECTION, SOLUTION INTRAVENOUS
Status: DISCONTINUED | OUTPATIENT
Start: 2025-03-24 | End: 2025-03-24

## 2025-03-24 RX ADMIN — ROCURONIUM BROMIDE 25 MG: 10 SOLUTION INTRAVENOUS at 01:03

## 2025-03-24 RX ADMIN — SODIUM CHLORIDE, SODIUM LACTATE, POTASSIUM CHLORIDE, AND CALCIUM CHLORIDE: .6; .31; .03; .02 INJECTION, SOLUTION INTRAVENOUS at 01:03

## 2025-03-24 RX ADMIN — SODIUM CHLORIDE, POTASSIUM CHLORIDE, SODIUM LACTATE AND CALCIUM CHLORIDE: 600; 310; 30; 20 INJECTION, SOLUTION INTRAVENOUS at 07:03

## 2025-03-24 RX ADMIN — DEXMEDETOMIDINE 4 MCG: 200 INJECTION, SOLUTION INTRAVENOUS at 02:03

## 2025-03-24 RX ADMIN — SENNOSIDES AND DOCUSATE SODIUM 1 TABLET: 50; 8.6 TABLET ORAL at 10:03

## 2025-03-24 RX ADMIN — POLYETHYLENE GLYCOL 3350 17 G: 17 POWDER, FOR SOLUTION ORAL at 04:03

## 2025-03-24 RX ADMIN — DEXAMETHASONE SODIUM PHOSPHATE 4 MG: 4 INJECTION, SOLUTION INTRA-ARTICULAR; INTRALESIONAL; INTRAMUSCULAR; INTRAVENOUS; SOFT TISSUE at 02:03

## 2025-03-24 RX ADMIN — FAMOTIDINE 20 MG: 10 INJECTION, SOLUTION INTRAVENOUS at 08:03

## 2025-03-24 RX ADMIN — SUCCINYLCHOLINE CHLORIDE 140 MG: 20 INJECTION, SOLUTION INTRAMUSCULAR; INTRAVENOUS; PARENTERAL at 01:03

## 2025-03-24 RX ADMIN — SODIUM CHLORIDE, POTASSIUM CHLORIDE, SODIUM LACTATE AND CALCIUM CHLORIDE: 600; 310; 30; 20 INJECTION, SOLUTION INTRAVENOUS at 11:03

## 2025-03-24 RX ADMIN — ROCURONIUM BROMIDE 5 MG: 10 SOLUTION INTRAVENOUS at 01:03

## 2025-03-24 RX ADMIN — IOHEXOL 50 ML: 350 INJECTION, SOLUTION INTRAVENOUS at 12:03

## 2025-03-24 RX ADMIN — PROPOFOL 150 MG: 10 INJECTION, EMULSION INTRAVENOUS at 01:03

## 2025-03-24 RX ADMIN — IOHEXOL 100 ML: 350 INJECTION, SOLUTION INTRAVENOUS at 04:03

## 2025-03-24 RX ADMIN — SODIUM CHLORIDE, POTASSIUM CHLORIDE, SODIUM LACTATE AND CALCIUM CHLORIDE: 600; 310; 30; 20 INJECTION, SOLUTION INTRAVENOUS at 04:03

## 2025-03-24 RX ADMIN — GLYCOPYRROLATE 0.2 MG: 0.2 INJECTION, SOLUTION INTRAMUSCULAR; INTRAVITREAL at 01:03

## 2025-03-24 RX ADMIN — POLYETHYLENE GLYCOL 3350 17 G: 17 POWDER, FOR SOLUTION ORAL at 10:03

## 2025-03-24 RX ADMIN — MORPHINE SULFATE 4 MG: 4 INJECTION INTRAVENOUS at 10:03

## 2025-03-24 RX ADMIN — LIDOCAINE HYDROCHLORIDE 100 MG: 20 INJECTION INTRAVENOUS at 01:03

## 2025-03-24 RX ADMIN — FENTANYL CITRATE 25 MCG: 50 INJECTION, SOLUTION INTRAMUSCULAR; INTRAVENOUS at 01:03

## 2025-03-24 RX ADMIN — MIDAZOLAM HYDROCHLORIDE 2 MG: 1 INJECTION, SOLUTION INTRAMUSCULAR; INTRAVENOUS at 01:03

## 2025-03-24 RX ADMIN — MORPHINE SULFATE 4 MG: 4 INJECTION INTRAVENOUS at 08:03

## 2025-03-24 RX ADMIN — FAMOTIDINE 20 MG: 10 INJECTION, SOLUTION INTRAVENOUS at 10:03

## 2025-03-24 RX ADMIN — IOHEXOL 1000 ML: 9 SOLUTION ORAL at 04:03

## 2025-03-24 RX ADMIN — ONDANSETRON 4 MG: 2 INJECTION INTRAMUSCULAR; INTRAVENOUS at 02:03

## 2025-03-24 RX ADMIN — SUGAMMADEX 200 MG: 100 INJECTION, SOLUTION INTRAVENOUS at 02:03

## 2025-03-24 NOTE — H&P
[facility] - Emergency Dept.  Colorectal Surgery  History and Physical    Patient Name: Zbigniew Cox  MRN: [id number]  Admission Date: 3/24/2025  Hospital Length of Stay: 0 days  Attending Physician: Ton Magallanes DO  Primary Care Provider: Mehreen, Primary Doctor      Subjective:     Chief Complaint/Reason for Admission: large bowel obstruction    History of Present Illness: pt is a 43 yo M who presents with worsening LLQ abdominal pain and changes in bowel habits for 1 month. Reports pain has persisted for the past month but progressively worsening. Describes it as cramping abdominal pain. Has had decreased frequency of bowel movements as well as change in caliber of stool with stool narrowing. He denies melena or hematochezia. Last had small BM yesterday and passed flatus in the ED. Has had weight loss of 20lbs over the past few months. Has never had colonoscopy or diverticulitis episode that he knows of    Current Facility-Administered Medications   Medication    famotidine (PF) injection 20 mg    lactated ringers infusion    morphine injection 4 mg    ondansetron injection 4 mg    sodium chloride 0.9% flush 10 mL     No current outpatient medications on file.       Review of patient's allergies indicates:  No Known Allergies    Past Medical History:   Diagnosis Date    Substance abuse      No past surgical history on file.  Family History       Problem Relation (Age of Onset)    Cancer Maternal Grandfather    Stroke Maternal Uncle          Tobacco Use    Smoking status: Every Day     Current packs/day: 1.00     Average packs/day: 1 pack/day for 10.0 years (10.0 ttl pk-yrs)     Types: Cigarettes    Smokeless tobacco: Never   Substance and Sexual Activity    Alcohol use: Yes     Comment: social    Drug use: Yes     Types: Marijuana    Sexual activity: Yes     Partners: Female       Objective:     Vital Signs (Most Recent):  Temp: 98.1 °F (36.7 °C) (03/24/25 0147)  Pulse: 68 (03/24/25 0400)  Resp: 18  (03/24/25 0807)  BP: (!) 153/108 (03/24/25 0400)  SpO2: 100 % (03/24/25 0400) Vital Signs (24h Range):  Temp:  [98.1 °F (36.7 °C)] 98.1 °F (36.7 °C)  Pulse:  [68-88] 68  Resp:  [16-18] 18  SpO2:  [96 %-100 %] 100 %  BP: (140-153)/() 153/108     Weight: 64 kg (141 lb 3.2 oz)  Body mass index is 20.85 kg/m².    Physical Exam  Constitutional:       Appearance: He is well-developed.   HENT:      Head: Normocephalic and atraumatic.   Eyes:      Conjunctiva/sclera: Conjunctivae normal.      Pupils: Pupils are equal, round, and reactive to light.   Neck:      Thyroid: No thyromegaly.   Cardiovascular:      Rate and Rhythm: Normal rate and regular rhythm.   Pulmonary:      Effort: Pulmonary effort is normal. No respiratory distress.   Abdominal:      Comments: Distended, TTP in LLQ with voluntary guarding, no rigidity, rebound or guarding   Musculoskeletal:         General: No tenderness. Normal range of motion.      Cervical back: Normal range of motion.   Skin:     General: Skin is warm and dry.      Capillary Refill: Capillary refill takes less than 2 seconds.   Neurological:      General: No focal deficit present.      Mental Status: He is alert and oriented to person, place, and time.           Significant Labs:  CBC:   Recent Labs   Lab 03/24/25  0312   WBC 8.30   RBC 3.76*   HGB 10.7*   HCT 32.7*      MCV 87   MCH 28.5   MCHC 32.7     CMP:   Recent Labs   Lab 03/24/25  0328   CALCIUM 9.2   ALBUMIN 3.5   *   K 4.8   CO2 22*      BUN 11   CREATININE 0.7   ALKPHOS 88   ALT 6*   AST 21   BILITOT 0.6       Significant Diagnostics:  CT: I have reviewed all pertinent results/findings within the past 24 hours:        1.  Markedly dilated large bowel from the cecum to the sigmoid colon where there is an area of apparent narrowing worrisome for high-grade obstruction as described above (endoscopy may be useful if clinically indicated)     2.  Emphysema is present; it is an independent risk factor for  lung cancer. Recommend patient be evaluated for low dose cancer screening protocol.     3.  Other nonemergent abnormalities as listed above, all stable from the previous exam    Assessment/Plan:     Active Diagnoses:    Diagnosis Date Noted POA    PRINCIPAL PROBLEM:  Large bowel obstruction [K56.609] 03/24/2025 Yes      Problems Resolved During this Admission:       - admit to CRS  - plan for OR today will attempt colonoscopy with stenting. If unable to obtain stenting will have ex-lap with colectomy and likely end colostomy.  - multimodal pain control  - will get CT rectal contrast to better evaluate length and location of obstructive segment. Discussed with radiology and difficult to tell given small bowel in the pelvis      Tasia Kumar MD  Colorectal Surgery  [facility] - Emergency Dept.

## 2025-03-24 NOTE — OP NOTE
[facility] - Surgery ([facility])  Colon and Rectal Surgery  Operative Note    SUMMARY     Date of Procedure: 3/24/2025     Procedure: Procedure(s) (LRB):  SIGMOIDOSCOPY, FLEXIBLE (N/A)     Surgeons and Role:     * Tasia Kumar MD - Primary    Assisting Surgeon: None    Pre-Operative Diagnosis: Large bowel obstruction [K56.609]    Post-Operative Diagnosis: Constipation    Anesthesia: General    Operative Findings (including complications, if any):  Colonoscope advanced to the transverse colon.  No distal luminal obstruction    Description of Technical Procedures:  Patient was identified in the preoperative holding area.  After informed consent was obtained he was taken to the operating room placed in the operating table in the supine position.  General anesthesia was administered.  He was then placed in lithotomy.  A time-out was performed indicating the correct patient procedure and operative site.  All the room were in agreement.    A digital rectal exam was performed with no obvious masses identified.  A colonoscope was advanced into the anus.  I was able to traverse the colonoscope to the transverse colon.  This distance was a proximally 150 cm from the anal verge.  Further passage of the colonoscope was limited due to solid stool throughout the colon.  I carefully inspected the descending and sigmoid colon as well as the rectum.  There was no evidence of luminal obstruction from intrinsic mass or extrinsic compression.  Preparation of the colon was not adequate for identification polyps.  The colon was decompressed as I removed the colonoscope.  The patient tolerated the procedure well    Significant Surgical Tasks Conducted by the Assistant(s), if Applicable:  None    Estimated Blood Loss (EBL): * No values recorded between 3/24/2025  1:43 PM and 3/24/2025  2:38 PM *           Implants: * No implants in log *    Specimens:   Specimen (24h ago, onward)      None             Condition: Good    Malignancy:  Procedure performed for malignancy no    Disposition: PACU - hemodynamically stable.    Attestation: I performed the procedure.

## 2025-03-24 NOTE — TRANSFER OF CARE
"Anesthesia Transfer of Care Note    Patient: Zbigniew Cox    Procedure(s) Performed: Procedure(s) (LRB):  SIGMOIDOSCOPY, FLEXIBLE (N/A)    Patient location: PACU    Anesthesia Type: general    Transport from OR: Transported from OR on room air with adequate spontaneous ventilation    Post pain: adequate analgesia    Post assessment: no apparent anesthetic complications and tolerated procedure well    Post vital signs: stable    Level of consciousness: responds to stimulation and awake    Nausea/Vomiting: no nausea/vomiting    Complications: none    Transfer of care protocol was followedComments: Report given to PACU RN at bedside. Hand off tool used. RN given opportunity to ask questions or clarify concerns. No Concerns verbalized. RN was asked if ready to assume care of patient. RN verbally confirmed. Pt. left in stable condition. SV. Vital Signs Return to Near Baseline. No s/s of distress noted.       Last vitals: Visit Vitals  BP (!) 153/108   Pulse 68   Temp 36.7 °C (98.1 °F) (Oral)   Resp 18   Ht 5' 9" (1.753 m)   Wt 64 kg (141 lb 3.2 oz)   SpO2 100%   BMI 20.85 kg/m²     "

## 2025-03-24 NOTE — ANESTHESIA PREPROCEDURE EVALUATION
03/24/2025  Zbigniew Cox is a 44 y.o., male.      Pre-op Assessment    I have reviewed the Patient Summary Reports.     I have reviewed the Nursing Notes. I have reviewed the NPO Status.      Review of Systems  Anesthesia Hx:  No problems with previous Anesthesia                Hematology/Oncology:  Hematology Normal   Oncology Normal                                   Renal/:  Renal/ Normal                 Hepatic/GI:  Hepatic/GI Normal                    Neurological:  Neurology Normal                                      Endocrine:  Endocrine Normal            Dermatological:  Skin Normal    Psych:  Psychiatric Normal                    Physical Exam  General: Well nourished, Cooperative, Alert and Oriented    Airway:  Mallampati: II / II  Mouth Opening: Normal  TM Distance: Normal  Tongue: Normal  Neck ROM: Normal ROM    Dental:  Intact      Patient Active Problem List   Diagnosis    Pneumonia    Tobacco abuse    Marijuana abuse    Large bowel obstruction         Anesthesia Plan  Type of Anesthesia, risks & benefits discussed:    Anesthesia Type: Gen ETT  Intra-op Monitoring Plan: Standard ASA Monitors  Post Op Pain Control Plan: multimodal analgesia  Induction:  IV  Airway Plan: Direct  Informed Consent: Informed consent signed with the Patient and all parties understand the risks and agree with anesthesia plan.  All questions answered.   ASA Score: 2  Day of Surgery Review of History & Physical: H&P Update referred to the surgeon/provider.I have interviewed and examined the patient. I have reviewed the patient's H&P dated: There are no significant changes. H&P completed by Anesthesiologist.    Ready For Surgery From Anesthesia Perspective.     .

## 2025-03-24 NOTE — ANESTHESIA POSTPROCEDURE EVALUATION
Anesthesia Post Evaluation    Patient: Zbigniew Cox    Procedure(s) Performed: Procedure(s) (LRB):  SIGMOIDOSCOPY, FLEXIBLE (N/A)    Final Anesthesia Type: general      Patient location during evaluation: PACU  Patient participation: Yes- Able to Participate  Level of consciousness: awake and alert, oriented and awake  Post-procedure vital signs: reviewed and stable  Pain management: adequate  Airway patency: patent    PONV status at discharge: No PONV  Anesthetic complications: no      Cardiovascular status: blood pressure returned to baseline  Respiratory status: unassisted  Hydration status: euvolemic  Follow-up not needed.              Vitals Value Taken Time   /96 03/24/25 15:10   Temp 36.4 °C (97.5 °F) 03/24/25 14:55   Pulse 65 03/24/25 15:12   Resp 9 03/24/25 15:12   SpO2 100 % 03/24/25 15:12   Vitals shown include unfiled device data.      No case tracking events are documented in the log.      Pain/Mitzi Score: Pain Rating Prior to Med Admin: 8 (3/24/2025  8:07 AM)  Mitzi Score: 8 (3/24/2025  3:00 PM)

## 2025-03-24 NOTE — PLAN OF CARE
Discussed poc with pt, pt verbalized understanding    Purposeful rounding every 2hours    VS wnl  Fall precautions in place, remains injury free  Pt denies c/o pain and nausea      IVFs@125mL  Accurate I&Os  Bed locked at lowest position  Call light within reach    Chart check complete  Will cont with POC

## 2025-03-24 NOTE — ED PROVIDER NOTES
"SCRIBE #1 NOTE: I, Henrybrian Jules, am scribing for, and in the presence of, Jeancarlos Woo Jr., MD. I have scribed the HPI, ROS, and PEx.    SCRIBE #2 NOTE: I, Wilma Mohan, am scribing for, and in the presence of,  Ton Magallanes DO. I have scribed the remaining portions of the note not scribed by Scribe #1.      History     Chief Complaint   Patient presents with    Constipation     Pt reports constipation x1 month. Last BM 2 weeks ago. Reports no relief from OTC medications     Abdominal Pain     Umbilical and LUQ/LLQ pain, reports "my belly button is normally sucked in, not poking out like this"      Review of patient's allergies indicates:  No Known Allergies      History of Present Illness     HPI    3/24/2025, 3:24 AM  History obtained from the patient      History of Present Illness: Zbigniew Cox is a 44 y.o. male patient with a PMHx of substance abuse who presents to the Emergency Department for evaluation of constipation which onset gradually 2 weeks ago. Patient states he had minimal improvement with enema but is still unable to have a BM. Symptoms are constant and moderate in severity. No mitigating or exacerbating factors reported. Associated sxs include decreased flatus and periumbilical abdominal pain. Patient denies any N/V/D, CP, SOB, fever, urinary symptoms, and all other sxs at this time. No further complaints or concerns at this time.       Arrival mode: Personal vehicle    PCP: Mehreen, Primary Doctor        Past Medical History:  Past Medical History:   Diagnosis Date    Substance abuse        Past Surgical History:  Past Surgical History:   Procedure Laterality Date    FLEXIBLE SIGMOIDOSCOPY N/A 3/24/2025    Procedure: SIGMOIDOSCOPY, FLEXIBLE;  Surgeon: Tasia Kumar MD;  Location: [facility] OR;  Service: Colon and Rectal;  Laterality: N/A;         Family History:  Family History   Problem Relation Name Age of Onset    Stroke Maternal Uncle      Cancer Maternal Grandfather         Social " History:  Social History     Tobacco Use    Smoking status: Every Day     Current packs/day: 1.00     Average packs/day: 1 pack/day for 10.0 years (10.0 ttl pk-yrs)     Types: Cigarettes    Smokeless tobacco: Never   Substance and Sexual Activity    Alcohol use: Yes     Comment: social    Drug use: Yes     Types: Marijuana    Sexual activity: Yes     Partners: Female        Review of Systems     Review of Systems   Constitutional:  Negative for fever.   HENT:  Negative for sore throat.    Respiratory:  Negative for shortness of breath.    Cardiovascular:  Negative for chest pain.   Gastrointestinal:  Positive for abdominal pain (periumbilical) and constipation. Negative for diarrhea, nausea and vomiting.        (+) Decreased flatus   Genitourinary:  Negative for dysuria.   Musculoskeletal:  Negative for back pain.   Skin:  Negative for rash.   Neurological:  Negative for weakness.   Hematological:  Does not bruise/bleed easily.   All other systems reviewed and are negative.       Physical Exam     Initial Vitals [03/24/25 0147]   BP Pulse Resp Temp SpO2   (!) 140/87 88 16 98.1 °F (36.7 °C) 96 %      MAP       --          Physical Exam   Nursing Notes and Vital Signs Reviewed.  Constitutional: Patient is in no acute distress. Well-developed and well-nourished.  Head: Atraumatic. Normocephalic.  Eyes: PERRL. EOM intact. Conjunctivae are not pale. No scleral icterus.  ENT: Mucous membranes are moist. Oropharynx is clear and symmetric.    Neck: Supple. Full ROM. No lymphadenopathy.  Cardiovascular: Regular rate. Regular rhythm. No murmurs, rubs, or gallops. Distal pulses are 2+ and symmetric.  Pulmonary/Chest: No respiratory distress. Clear to auscultation bilaterally. No wheezing or rales.  Abdominal: Soft and non-distended.  There is generalized abdominal tenderness.  No rebound, guarding, or rigidity. Good bowel sounds.  Genitourinary: No CVA tenderness  Musculoskeletal: Moves all extremities. No obvious deformities.  No edema. No calf tenderness.  Skin: Warm and dry.  Neurological:  Alert, awake, and appropriate.  Normal speech.  No acute focal neurological deficits are appreciated.  Psychiatric: Normal affect. Good eye contact. Appropriate in content.     ED Course   Critical Care    Date/Time: 3/24/2025 7:15 AM    Performed by: Ton Magallanes DO  Authorized by: Ton Magalalnes DO  Direct patient critical care time: 20 minutes  Ordering / reviewing critical care time: 5 minutes  Documentation critical care time: 5 minutes  Consulting other physicians critical care time: 5 minutes  Total critical care time (exclusive of procedural time) : 35 minutes  Critical care time was exclusive of separately billable procedures and treating other patients.  Critical care was necessary to treat or prevent imminent or life-threatening deterioration of the following conditions: Large bowel obstruction.  Critical care was time spent personally by me on the following activities: discussions with consultants, interpretation of cardiac output measurements, evaluation of patient's response to treatment, examination of patient, obtaining history from patient or surrogate, ordering and performing treatments and interventions, ordering and review of laboratory studies, ordering and review of radiographic studies, re-evaluation of patient's condition and review of old charts.        ED Vital Signs:  Vitals:    03/24/25 1505 03/24/25 1510 03/24/25 1515 03/24/25 1520   BP: (!) 140/104 (!) 135/96 (!) 127/91 132/84   Pulse: 71 66 64 67   Resp: 20 14 13 12   Temp:       TempSrc:       SpO2: 100% 100% 100% 100%   Weight:       Height:        03/24/25 1530 03/24/25 1554 03/24/25 1941 03/24/25 2048   BP: 125/86 (!) 139/93  (!) 150/95   Pulse: 73 68 68 69   Resp: 13 18 16 17   Temp:  97.6 °F (36.4 °C)  98.9 °F (37.2 °C)   TempSrc:  Oral  Oral   SpO2: 100% 98% 97% 100%   Weight:       Height:        03/24/25 2231 03/24/25 2307 03/25/25 0040  "03/25/25 0432   BP:   (!) 143/83 (!) 154/106   Pulse:   61 66   Resp: 18  17 17   Temp:   99.9 °F (37.7 °C) 99.8 °F (37.7 °C)   TempSrc:   Oral Oral   SpO2:   95% 100%   Weight:  64 kg (141 lb 1.5 oz)     Height:  5' 9" (1.753 m)      03/25/25 0733 03/25/25 0830 03/25/25 1209   BP:  125/86 (!) 139/90   Pulse: 65 73 72   Resp: 18 17 16   Temp:  98.6 °F (37 °C) 98 °F (36.7 °C)   TempSrc:  Oral Oral   SpO2: 99% 99% 99%   Weight:      Height:          Abnormal Lab Results:  Labs Reviewed   COMPREHENSIVE METABOLIC PANEL - Abnormal       Result Value    Sodium 134 (*)     Potassium 4.8      Chloride 101      CO2 22 (*)     Glucose 85      BUN 11      Creatinine 0.7      Calcium 9.2      Protein Total 7.1      Albumin 3.5      Bilirubin Total 0.6      ALP 88      AST 21      ALT 6 (*)     Anion Gap 11      eGFR >60     CBC WITH DIFFERENTIAL - Abnormal    WBC 8.30      RBC 3.76 (*)     HGB 10.7 (*)     HCT 32.7 (*)     MCV 87      MCH 28.5      MCHC 32.7      RDW 19.2 (*)     Platelet Count 288      MPV 8.8 (*)     Nucleated RBC 0      Neut % 79.4 (*)     Lymph % 13.1 (*)     Mono % 5.4      Eos % 1.7      Basophil % 0.2      Imm Grans % 0.2      Neut # 6.58      Lymph # 1.09      Mono # 0.45      Eos # 0.14      Baso # 0.02      Imm Grans # 0.02     LIPASE - Normal    Lipase Level 14     URINALYSIS, REFLEX TO URINE CULTURE - Normal    Color, UA Yellow      Appearance, UA Clear      pH, UA 6.0      Spec Grav UA 1.020      Protein, UA Negative      Glucose, UA Negative      Ketones, UA Negative      Bilirubin, UA Negative      Blood, UA Negative      Nitrites, UA Negative      Urobilinogen, UA Negative      Leukocyte Esterase, UA Negative     ALCOHOL,MEDICAL (ETHANOL) - Normal    Alcohol, Serum <10     CBC W/ AUTO DIFFERENTIAL    Narrative:     The following orders were created for panel order CBC auto differential.  Procedure                               Abnormality         Status                     ---------              "                  -----------         ------                     CBC with Differential[[id number]]       Abnormal            Final result                 Please view results for these tests on the individual orders.        All Lab Results:  Results for orders placed or performed during the hospital encounter of 03/24/25   CBC with Differential    Collection Time: 03/24/25  3:12 AM   Result Value Ref Range    WBC 8.30 3.90 - 12.70 K/uL    RBC 3.76 (L) 4.60 - 6.20 M/uL    HGB 10.7 (L) 14.0 - 18.0 gm/dL    HCT 32.7 (L) 40.0 - 54.0 %    MCV 87 82 - 98 fL    MCH 28.5 27.0 - 50.0 pg    MCHC 32.7 32.0 - 36.0 g/dL    RDW 19.2 (H) 11.5 - 14.5 %    Platelet Count 288 150 - 450 K/uL    MPV 8.8 (L) 9.2 - 12.9 fL    Nucleated RBC 0 <=0 /100 WBC    Neut % 79.4 (H) 38 - 73 %    Lymph % 13.1 (L) 18 - 48 %    Mono % 5.4 4 - 15 %    Eos % 1.7 <=8 %    Basophil % 0.2 <=1.9 %    Imm Grans % 0.2 0.0 - 0.5 %    Neut # 6.58 1.8 - 7.7 K/uL    Lymph # 1.09 1 - 4.8 K/uL    Mono # 0.45 0.3 - 1 K/uL    Eos # 0.14 <=0.5 K/uL    Baso # 0.02 <=0.2 K/uL    Imm Grans # 0.02 0.00 - 0.04 K/uL   Comprehensive metabolic panel    Collection Time: 03/24/25  3:28 AM   Result Value Ref Range    Sodium 134 (L) 136 - 145 mmol/L    Potassium 4.8 3.5 - 5.1 mmol/L    Chloride 101 95 - 110 mmol/L    CO2 22 (L) 23 - 29 mmol/L    Glucose 85 70 - 110 mg/dL    BUN 11 6 - 20 mg/dL    Creatinine 0.7 0.5 - 1.4 mg/dL    Calcium 9.2 8.7 - 10.5 mg/dL    Protein Total 7.1 6.0 - 8.4 gm/dL    Albumin 3.5 3.5 - 5.2 g/dL    Bilirubin Total 0.6 0.1 - 1.0 mg/dL    ALP 88 40 - 150 unit/L    AST 21 11 - 45 unit/L    ALT 6 (L) 10 - 44 unit/L    Anion Gap 11 8 - 16 mmol/L    eGFR >60 >60 mL/min/1.73/m2   Lipase    Collection Time: 03/24/25  3:28 AM   Result Value Ref Range    Lipase Level 14 4 - 60 U/L   Ethanol    Collection Time: 03/24/25  3:28 AM   Result Value Ref Range    Alcohol, Serum <10 <10 mg/dL   Urinalysis, Reflex to Urine Culture Urine, Clean Catch    Collection Time:  03/24/25  5:23 AM    Specimen: Urine   Result Value Ref Range    Color, UA Yellow Straw, Argelia, Yellow, Light-Orange    Appearance, UA Clear Clear    pH, UA 6.0 5.0 - 8.0    Spec Grav UA 1.020 1.005 - 1.030    Protein, UA Negative Negative    Glucose, UA Negative Negative    Ketones, UA Negative Negative    Bilirubin, UA Negative Negative    Blood, UA Negative Negative    Nitrites, UA Negative Negative    Urobilinogen, UA Negative <2.0 EU/dL    Leukocyte Esterase, UA Negative Negative         Imaging Results:  Imaging Results              CT Abdomen Pelvis  Without Contrast (Final result)  Result time 03/24/25 12:22:41      Final result by Holden Del Valle MD (03/24/25 12:22:41)                   Impression:      No CT evidence of sigmoid colon mass or stricture.    All CT scans at this facility are performed  using dose modulation techniques as appropriate to performed exam including the following:  automated exposure control; adjustment of mA and/or kV according to the patients size (this includes techniques or standardized protocols for targeted exams where dose is matched to indication/reason for exam: i.e. extremities or head);  iterative reconstruction technique.      Electronically signed by: Holden Del Valle MD  Date:    03/24/2025  Time:    12:22               Narrative:    EXAMINATION:  CT ABDOMEN PELVIS WITHOUT CONTRAST    CLINICAL HISTORY:  rectosigmoid mass; evaluate for malignancy.    TECHNIQUE:  Axial CT images performed through the abdomen and pelvis without intravenous contrast. Multiplanar reformats were performed and interpreted.    COMPARISON:  03/24/2025    FINDINGS:  The evaluation was performed with rectal contrast.    Satisfactory opacification of the colon with enteric contrast.  No gross colon mass or stricture.  There is a large amount of stool in the right colon which is moderately distended.  The remainder of the examination is stable.                                       CT Abdomen Pelvis With  IV Contrast Routine Oral Contrast (Final result)  Result time 03/24/25 06:05:12      Final result by Gene Zarate MD (03/24/25 06:05:12)                   Impression:      1.  Markedly dilated large bowel from the cecum to the sigmoid colon where there is an area of apparent narrowing worrisome for high-grade obstruction as described above (endoscopy may be useful if clinically indicated)    2.  Emphysema is present; it is an independent risk factor for lung cancer. Recommend patient be evaluated for low dose cancer screening protocol.    3.  Other nonemergent abnormalities as listed above, all stable from the previous exam    All CT scans at [this location] are performed using dose modulation techniques as appropriate to a performed exam including the following:  Automated exposure control; adjustment of the mA and/or kV according to patient size (this includes techniques or standardized protocols for targeted exams where dose is matched to indication / reason for exam; i.e. extremities or head); use of iterative reconstruction technique.    Finalized on: 3/24/2025 6:05 AM By:  Gene Zarate MD  [facility]# [id number]      2025-03-24 06:07:18.328     [facility]               Narrative:    EXAM: CT ABDOMEN PELVIS WITH IV CONTRAST    CLINICAL INDICATION: Abscess, possible bowel obstruction    TECHNIQUE: Axial and multiplanar 2-D reformations provided.  With IV contrast    PRIORS: August 2024    FINDINGS:  1.  Small calcified pulmonary granuloma superior segment right lower lobe is stable from previous exams.  Emphysema is present as previously described.    2.  Asymmetric costochondritis prominence at the costosternal junction involving left rib #8, stable from the previous exam  3.  The large bowel is dilated from the cecum to the level of the sigmoid colon without evidence of mass nor perforation.  However in the sigmoid colon there is a focal area of narrowing with only minimal gas P on that and that suggests area of  high-grade obstruction (series 2/99)  4.  The appendix is not definitely identified but there is no evidence for appendicitis  5.  Minimal aortoiliac plaquing without aneurysm    Otherwise the lung bases, organs of the abdomen and pelvis, GI tract,  tract, retroperitoneum, soft tissues, muscles and bones are unremarkable.                                         X-Ray Abdomen Flat And Erect (Final result)  Result time 03/24/25 07:55:52      Final result by Bassam Bryson MD (03/24/25 07:55:52)                   Impression:      Moderate colonic distension. Moderate constipation within portions of the large bowel.      Electronically signed by: Bassam Bryson MD  Date:    03/24/2025  Time:    07:55               Narrative:    EXAMINATION:  XR ABDOMEN FLAT AND ERECT    CLINICAL HISTORY:  Constipation, unspecified    COMPARISON:  12/13/2023    FINDINGS:  Nonobstructive bowel gas pattern is noted. No radiopaque kidney calculus is identified.  Moderate colonic distension.  Moderate constipation within portions of the large bowel.  No free air.  Lung bases are clear.  No evidence of organomegaly.  The bones are intact.                                      No EKG ordered.      The Emergency Provider reviewed the vital signs and test results, which are outlined above.     ED Discussion     6:00 AM: Dr. Woo transfers care of patient to Dr. Magallanes pending imaging results.    7:02 AM: Discussed case with Dr. aTsia Kumar MD (Colorectal Surgery). Informed her the pt has no Hx of colonoscopy or diverticulitis. Dr. Kumar recommends keeping NPO for the pt and admitting him to her. He will try to perform and colonoscopy and place a stent today.   Admitting Service: Colorectal Surgery  Admitting Physician: Dr. Kumar  Admit to: Inpatient    7:05 AM: Re-evaluated pt. I have discussed test results, shared treatment plan, and the need for admission with patient/family/caretaker at bedside. Pt and/or family/caretaker  express understanding at this time and agree with all information. All questions answered. Pt/caretaker/family member(s) have no further questions or concerns at this time. Pt is ready for admit.      ED Course as of 03/27/25 1552   Mon Mar 24, 2025   0642 Urinalysis, Reflex to Urine Culture Urine, Clean Catch  No UTI [CD]   0642 Ethanol  Within normal limits [CD]   0642 Lipase  Within normal limits [CD]   0642 Comprehensive metabolic panel(!)  Nonspecific findings [CD]   0642 CBC auto differential(!)  Nonspecific findings [CD]   0707 CT Abdomen Pelvis With IV Contrast Routine Oral Contrast    Markedly dilated large bowel from the cecum to the sigmoid colon where there is an area of apparent narrowing worrisome for high-grade obstruction as described above (endoscopy may be useful if clinically indicated) [CD]      ED Course User Index  [CD] Ton Magallanes, DO     Medical Decision Making  Amount and/or Complexity of Data Reviewed  Labs: ordered. Decision-making details documented in ED Course.  Radiology: ordered. Decision-making details documented in ED Course.    Risk  Prescription drug management.  Decision regarding hospitalization.  Risk Details: Differential diagnosis includes but is not limited to:  Bowel obstruction, sigmoid stricture, diverticulitis, UTI, gastroenteritis, constipation                 ED Medication(s):  Medications   iohexoL (OMNIPAQUE 350) injection 100 mL (100 mLs Intravenous Given 3/24/25 0417)   iohexoL (OMNIPAQUE 9) oral solution 1,000 mL (1,000 mLs Oral Given 3/24/25 0417)   iohexoL (OMNIPAQUE 350) injection 50 mL (50 mLs Rectal Given 3/24/25 1217)       There are no discharge medications for this patient.       Follow-up Information       No, Primary Doctor Follow up.                                 Scribe Attestation:   Scribe #1: I performed the above scribed service and the documentation accurately describes the services I performed. I attest to the accuracy of the note.      Attending:   Physician Attestation Statement for Scribe #1: I, Jeancarlos Woo Jr., MD, personally performed the services described in this documentation, as scribed by Henry Jules, in my presence, and it is both accurate and complete.       Scribe Attestation:   Scribe #2: I performed the above scribed service and the documentation accurately describes the services I performed. I attest to the accuracy of the note.    Attending Attestation:           Physician Attestation for Scribe:    Physician Attestation Statement for Scribe #2: I, Ton Magallanes DO, reviewed documentation, as scribed by Wilma Mohan in my presence, and it is both accurate and complete. I also acknowledge and confirm the content of the note done by Joseyibe #1.           Clinical Impression       ICD-10-CM ICD-9-CM   1. Large bowel obstruction  K56.609 560.9   2. Chronic idiopathic constipation  K59.04 564.00   3. Constipation  K59.00 564.00   4. Heroin use  F11.90 305.50       Disposition:   Disposition: Admitted  Condition: Stable       Ton Magallanes DO  03/27/25 1554

## 2025-03-24 NOTE — ANESTHESIA PROCEDURE NOTES
Intubation    Date/Time: 3/24/2025 1:52 PM    Performed by: Jesenia Whitlock CRNA  Authorized by: Neville Ng MD    Intubation:     Induction:  Rapid sequence induction    Intubated:  Postinduction    Mask Ventilation:  Not attempted    Attempts:  1    Attempted By:  CRNA    Method of Intubation:  Video laryngoscopy    Blade:  Claros 3    Laryngeal View Grade: Grade I - full view of cords      Difficult Airway Encountered?: No      Complications:  None    Airway Device:  Oral endotracheal tube    Airway Device Size:  7.5    Style/Cuff Inflation:  Cuffed (inflated to minimal occlusive pressure)    Tube secured:  24    Secured at:  The lips    Placement Verified By:  Capnometry    Complicating Factors:  None    Findings Post-Intubation:  BS equal bilateral and atraumatic/condition of teeth unchanged  Notes:      Claros used for extra protection of dental work.

## 2025-03-25 VITALS
HEIGHT: 69 IN | OXYGEN SATURATION: 99 % | HEART RATE: 72 BPM | RESPIRATION RATE: 16 BRPM | DIASTOLIC BLOOD PRESSURE: 90 MMHG | BODY MASS INDEX: 20.9 KG/M2 | WEIGHT: 141.13 LBS | TEMPERATURE: 98 F | SYSTOLIC BLOOD PRESSURE: 139 MMHG

## 2025-03-25 PROBLEM — F11.90 HEROIN USE: Status: ACTIVE | Noted: 2025-03-25

## 2025-03-25 PROCEDURE — 94761 N-INVAS EAR/PLS OXIMETRY MLT: CPT

## 2025-03-25 PROCEDURE — 25000003 PHARM REV CODE 250: Performed by: STUDENT IN AN ORGANIZED HEALTH CARE EDUCATION/TRAINING PROGRAM

## 2025-03-25 PROCEDURE — 96376 TX/PRO/DX INJ SAME DRUG ADON: CPT

## 2025-03-25 PROCEDURE — G0378 HOSPITAL OBSERVATION PER HR: HCPCS

## 2025-03-25 PROCEDURE — 96361 HYDRATE IV INFUSION ADD-ON: CPT

## 2025-03-25 PROCEDURE — 99232 SBSQ HOSP IP/OBS MODERATE 35: CPT | Mod: ,,, | Performed by: STUDENT IN AN ORGANIZED HEALTH CARE EDUCATION/TRAINING PROGRAM

## 2025-03-25 PROCEDURE — 63600175 PHARM REV CODE 636 W HCPCS: Performed by: STUDENT IN AN ORGANIZED HEALTH CARE EDUCATION/TRAINING PROGRAM

## 2025-03-25 RX ADMIN — SENNOSIDES AND DOCUSATE SODIUM 1 TABLET: 50; 8.6 TABLET ORAL at 09:03

## 2025-03-25 RX ADMIN — POLYETHYLENE GLYCOL 3350 17 G: 17 POWDER, FOR SOLUTION ORAL at 09:03

## 2025-03-25 RX ADMIN — FAMOTIDINE 20 MG: 10 INJECTION, SOLUTION INTRAVENOUS at 09:03

## 2025-03-25 RX ADMIN — SODIUM CHLORIDE, POTASSIUM CHLORIDE, SODIUM LACTATE AND CALCIUM CHLORIDE: 600; 310; 30; 20 INJECTION, SOLUTION INTRAVENOUS at 06:03

## 2025-03-25 NOTE — PLAN OF CARE
[facility] - Med Surg  Discharge Final Note    Primary Care Provider: No, Primary Doctor    Expected Discharge Date: 3/25/2025    Final Discharge Note (most recent)       Final Note - 03/25/25 1128          Final Note    Assessment Type Final Discharge Note     Anticipated Discharge Disposition Home or Self Care     Hospital Resources/Appts/Education Provided Appointment suggestion unavailable        Post-Acute Status    Discharge Delays None known at this time                     Important Message from Medicare             Contact Info       No, Primary Doctor   Relationship: PCP - General        Next Steps: Follow up          DC Dispo: home    PCP: no appt suggestion available    DME: none    CM reviewed chart; no d/c needs noted.

## 2025-03-25 NOTE — DISCHARGE SUMMARY
[facility] - Med Surg  Discharge Summary      Patient Name: Zbigniew Cox  MRN: [id number]  Admission Date: 3/24/2025  Hospital Length of Stay: 1 days  Discharge Date and Time: No discharge date for patient encounter.  Attending Physician: Tasia Kumar MD    Discharging Provider: Tasia Kumar MD  Primary Care Physician: Mehreen, Primary Doctor    HPI:   No notes on file    Procedure(s) (LRB):  SIGMOIDOSCOPY, FLEXIBLE (N/A)     Indwelling Lines/Drains at time of discharge:  Lines/Drains/Airways       None                   Hospital Course:  Admitted with concern for LBO. Ruled out with flex sig and CT rectal contrast. Passing flatus. Pt endorsed heroin use after procedure    Consults:     Significant Diagnostic Studies: N/A  Colonoscopy and CT without obstruction    Pending Diagnostic Studies:       Procedure Component Value Units Date/Time    Drug screen panel, in-house [[id number]] Collected: 03/24/25 0523    Order Status: Sent Lab Status: No result     Specimen: Urine     X-Ray Abdomen Flat And Erect [[id number]] Resulted: 03/25/25 0930    Order Status: Sent Lab Status: In process Updated: 03/25/25 0931            Final Active Diagnoses:    Diagnosis Date Noted POA    PRINCIPAL PROBLEM:  Chronic idiopathic constipation [K59.04] 03/24/2025 Yes    Heroin use [F11.90] 03/25/2025 Yes      Problems Resolved During this Admission:     No new Assessment & Plan notes have been filed under this hospital service since the last note was generated.  Service: General Surgery      Discharged Condition: good    Disposition: Home or Self Care    Follow Up:   Follow-up Information       No, Primary Doctor Follow up.                           Patient Instructions:      Diet Adult Regular     Notify your health care provider if you experience any of the following:  temperature >100.4     Notify your health care provider if you experience any of the following:  persistent nausea and vomiting or diarrhea     Notify your health  care provider if you experience any of the following:  severe uncontrolled pain     Activity as tolerated     Medications:  Reconciled Home Medications:      Medication List      You have not been prescribed any medications.         Time spent on the discharge of patient: 20 minutes    Tasia Kumar MD  Cardiac Electrophysiology  [facility]

## 2025-03-25 NOTE — CONSULTS
"[facility] Medicine  Consult Note    Patient Name: Zbigniew Cox  MRN: [id number]  Admission Date: 3/24/2025  Hospital Length of Stay: 1 days  Attending Physician: Tasia Kumar MD   Primary Care Provider: Mehreen, Primary Doctor           Patient information was obtained from patient and ER records.     Consults  Subjective:     Principal Problem: Chronic idiopathic constipation    Chief Complaint:   Chief Complaint   Patient presents with    Constipation     Pt reports constipation x1 month. Last BM 2 weeks ago. Reports no relief from OTC medications     Abdominal Pain     Umbilical and LUQ/LLQ pain, reports "my belly button is normally sucked in, not poking out like this"         HPI: Zbigniew Cox is a 44 year old male who  has a past medical history of Substance abuse admitted by General Surgery for a large bowel obstruction. He underwent underwent a flex sig by Dr. Kumar on yesterday, 3/24/25. HM consulted for concerns of possible withdrawal due to lethargy and fever. Patient admits to heroine use 2 days prior to admission. He does not appear to be withdrawing, no tachycardia, anxiety, irritability, agitation, or tremors noted. No muscle aches. No lethargy noted. He currently denies chest pain, SOB, abdominal pain, N/V. He has not had BM yet but passing gas.     Past Medical History:   Diagnosis Date    Substance abuse        History reviewed. No pertinent surgical history.    Review of patient's allergies indicates:  No Known Allergies    No current facility-administered medications on file prior to encounter.     No current outpatient medications on file prior to encounter.     Family History       Problem Relation (Age of Onset)    Cancer Maternal Grandfather    Stroke Maternal Uncle          Tobacco Use    Smoking status: Every Day     Current packs/day: 1.00     Average packs/day: 1 pack/day for 10.0 years (10.0 ttl pk-yrs)     Types: Cigarettes    Smokeless tobacco: Never "   Substance and Sexual Activity    Alcohol use: Yes     Comment: social    Drug use: Yes     Types: Marijuana    Sexual activity: Yes     Partners: Female     Review of Systems   Constitutional: Negative.    HENT: Negative.     Eyes: Negative.    Respiratory: Negative.     Cardiovascular: Negative.    Gastrointestinal:  Positive for constipation. Negative for abdominal pain, diarrhea, nausea and vomiting.   Endocrine: Negative.    Genitourinary: Negative.    Musculoskeletal: Negative.    Skin: Negative.    Allergic/Immunologic: Negative.    Neurological: Negative.    Hematological: Negative.    Psychiatric/Behavioral: Negative.       Objective:     Vital Signs (Most Recent):  Temp: 98.6 °F (37 °C) (03/25/25 0830)  Pulse: 73 (03/25/25 0830)  Resp: 17 (03/25/25 0830)  BP: 125/86 (03/25/25 0830)  SpO2: 99 % (03/25/25 0830) Vital Signs (24h Range):  Temp:  [97.5 °F (36.4 °C)-99.9 °F (37.7 °C)] 98.6 °F (37 °C)  Pulse:  [61-77] 73  Resp:  [12-20] 17  SpO2:  [95 %-100 %] 99 %  BP: (125-154)/() 125/86     Weight: 64 kg (141 lb 1.5 oz)  Body mass index is 20.84 kg/m².     Physical Exam  Vitals reviewed.   Constitutional:       General: He is not in acute distress.  HENT:      Head: Normocephalic.      Mouth/Throat:      Mouth: Mucous membranes are moist.   Eyes:      Extraocular Movements: Extraocular movements intact.   Cardiovascular:      Rate and Rhythm: Normal rate.      Pulses: Normal pulses.   Pulmonary:      Effort: Pulmonary effort is normal. No respiratory distress.      Breath sounds: Normal breath sounds.   Abdominal:      General: Bowel sounds are normal. There is no distension.      Palpations: Abdomen is soft.      Tenderness: There is no abdominal tenderness.   Genitourinary:     Comments: deferred  Musculoskeletal:         General: Normal range of motion.      Cervical back: Neck supple.      Right lower leg: No edema.      Left lower leg: No edema.   Skin:     General: Skin is warm and dry.       Capillary Refill: Capillary refill takes less than 2 seconds.   Neurological:      General: No focal deficit present.      Mental Status: He is alert and oriented to person, place, and time.   Psychiatric:         Mood and Affect: Mood normal.         Behavior: Behavior normal.         Thought Content: Thought content normal.          Significant Labs: All pertinent labs within the past 24 hours have been reviewed.  CBC:   Recent Labs   Lab 03/24/25  0312   WBC 8.30   HGB 10.7*   HCT 32.7*        CMP:   Recent Labs   Lab 03/24/25  0328   *   K 4.8      CO2 22*   BUN 11   CREATININE 0.7   CALCIUM 9.2   ALBUMIN 3.5   BILITOT 0.6   ALKPHOS 88   AST 21   ALT 6*   ANIONGAP 11     Lipase:   Recent Labs   Lab 03/24/25  0328   LIPASE 14       Significant Imaging: I have reviewed all pertinent imaging results/findings within the past 24 hours.  Assessment/Plan:     * Chronic idiopathic constipation  -General surgery primary  -S/p flex sig on 3/24/25  -Management per primary team      Heroin use  -Pt admitted to heroin use 2 days prior to admission  -He doesn't appear to be withdrawing at this time  -Low grade temp could be reactive - Tmax 99.9. He is currently 98.6. No WBC count  -Will defer to General surgery regarding discharge        VTE Risk Mitigation (From admission, onward)           Ordered     Place sequential compression device  Until discontinued         03/24/25 0707     IP VTE LOW RISK PATIENT  Once         03/24/25 0707                        Thank you for your consult. I will follow-up with patient. Please contact us if you have any additional questions.    ANA Crespo  Department of Hospital Medicine   [facility] - Med Surg

## 2025-03-25 NOTE — HPI
Zbigniew Cox is a 44 year old male who  has a past medical history of Substance abuse admitted by General Surgery for a large bowel obstruction. He underwent underwent a flex sig by Dr. Kumar on yesterday, 3/24/25.  consulted for concerns of possible withdrawal due to lethargy and fever. Patient admits to heroine use 2 days prior to admission. He does not appear to be withdrawing, no tachycardia, anxiety, irritability, agitation, or tremors noted. No muscle aches. No lethargy noted. He currently denies chest pain, SOB, abdominal pain, N/V. He has not had BM yet but passing gas.

## 2025-03-25 NOTE — SUBJECTIVE & OBJECTIVE
Past Medical History:   Diagnosis Date    Substance abuse        History reviewed. No pertinent surgical history.    Review of patient's allergies indicates:  No Known Allergies    No current facility-administered medications on file prior to encounter.     No current outpatient medications on file prior to encounter.     Family History       Problem Relation (Age of Onset)    Cancer Maternal Grandfather    Stroke Maternal Uncle          Tobacco Use    Smoking status: Every Day     Current packs/day: 1.00     Average packs/day: 1 pack/day for 10.0 years (10.0 ttl pk-yrs)     Types: Cigarettes    Smokeless tobacco: Never   Substance and Sexual Activity    Alcohol use: Yes     Comment: social    Drug use: Yes     Types: Marijuana    Sexual activity: Yes     Partners: Female     Review of Systems   Constitutional: Negative.    HENT: Negative.     Eyes: Negative.    Respiratory: Negative.     Cardiovascular: Negative.    Gastrointestinal:  Positive for constipation. Negative for abdominal pain, diarrhea, nausea and vomiting.   Endocrine: Negative.    Genitourinary: Negative.    Musculoskeletal: Negative.    Skin: Negative.    Allergic/Immunologic: Negative.    Neurological: Negative.    Hematological: Negative.    Psychiatric/Behavioral: Negative.       Objective:     Vital Signs (Most Recent):  Temp: 98.6 °F (37 °C) (03/25/25 0830)  Pulse: 73 (03/25/25 0830)  Resp: 17 (03/25/25 0830)  BP: 125/86 (03/25/25 0830)  SpO2: 99 % (03/25/25 0830) Vital Signs (24h Range):  Temp:  [97.5 °F (36.4 °C)-99.9 °F (37.7 °C)] 98.6 °F (37 °C)  Pulse:  [61-77] 73  Resp:  [12-20] 17  SpO2:  [95 %-100 %] 99 %  BP: (125-154)/() 125/86     Weight: 64 kg (141 lb 1.5 oz)  Body mass index is 20.84 kg/m².     Physical Exam  Vitals reviewed.   Constitutional:       General: He is not in acute distress.  HENT:      Head: Normocephalic.      Mouth/Throat:      Mouth: Mucous membranes are moist.   Eyes:      Extraocular Movements: Extraocular  movements intact.   Cardiovascular:      Rate and Rhythm: Normal rate.      Pulses: Normal pulses.   Pulmonary:      Effort: Pulmonary effort is normal. No respiratory distress.      Breath sounds: Normal breath sounds.   Abdominal:      General: Bowel sounds are normal. There is no distension.      Palpations: Abdomen is soft.      Tenderness: There is no abdominal tenderness.   Genitourinary:     Comments: deferred  Musculoskeletal:         General: Normal range of motion.      Cervical back: Neck supple.      Right lower leg: No edema.      Left lower leg: No edema.   Skin:     General: Skin is warm and dry.      Capillary Refill: Capillary refill takes less than 2 seconds.   Neurological:      General: No focal deficit present.      Mental Status: He is alert and oriented to person, place, and time.   Psychiatric:         Mood and Affect: Mood normal.         Behavior: Behavior normal.         Thought Content: Thought content normal.          Significant Labs: All pertinent labs within the past 24 hours have been reviewed.  CBC:   Recent Labs   Lab 03/24/25  0312   WBC 8.30   HGB 10.7*   HCT 32.7*        CMP:   Recent Labs   Lab 03/24/25  0328   *   K 4.8      CO2 22*   BUN 11   CREATININE 0.7   CALCIUM 9.2   ALBUMIN 3.5   BILITOT 0.6   ALKPHOS 88   AST 21   ALT 6*   ANIONGAP 11     Lipase:   Recent Labs   Lab 03/24/25  0328   LIPASE 14       Significant Imaging: I have reviewed all pertinent imaging results/findings within the past 24 hours.

## 2025-03-25 NOTE — PLAN OF CARE
Discussed poc with pt, pt verbalized understanding    Purposeful rounding every 2hours    VS wnl  Fall precautions in place, remains injury free  Pt denies c/o nausea and pain       Bed locked at lowest position  Call light within reach    Chart check complete  Pt is ready for discharge

## 2025-03-25 NOTE — PLAN OF CARE
Plan of care reviewed with with patient with verbal understanding. Chart and orders reviewed. Patient remains free from falls this shift, safety precautions in place. Patient resting comfortably in bed. Pain controlled with as needed pain medication. Purposeful rounding with bed in lowest position, side rails up, call light and personal items within reach. Heart monitor in place. Will continue to monitor until the end of shift. No needs at this time.    Problem: Adult Inpatient Plan of Care  Goal: Plan of Care Review  Outcome: Progressing  Goal: Patient-Specific Goal (Individualized)  Outcome: Progressing  Goal: Absence of Hospital-Acquired Illness or Injury  Outcome: Progressing  Goal: Optimal Comfort and Wellbeing  Outcome: Progressing  Goal: Readiness for Transition of Care  Outcome: Progressing     Problem: Pneumonia  Goal: Fluid Balance  Outcome: Progressing  Goal: Resolution of Infection Signs and Symptoms  Outcome: Progressing  Goal: Effective Oxygenation and Ventilation  Outcome: Progressing

## 2025-03-25 NOTE — ASSESSMENT & PLAN NOTE
-Pt admitted to heroin use 2 days prior to admission  -He doesn't appear to be withdrawing at this time  -Low grade temp could be reactive - Tmax 99.9. He is currently 98.6. No WBC count  -Will defer to General surgery regarding discharge

## 2025-04-03 ENCOUNTER — HOSPITAL ENCOUNTER (EMERGENCY)
Facility: HOSPITAL | Age: 45
Discharge: HOME | End: 2025-04-04
Attending: EMERGENCY MEDICINE
Payer: MEDICAID

## 2025-04-03 DIAGNOSIS — R11.2 NAUSEA AND VOMITING, UNSPECIFIED VOMITING TYPE: Primary | ICD-10-CM

## 2025-04-03 LAB
ABSOLUTE EOSINOPHIL (OHS): 0.04 K/UL
ABSOLUTE MONOCYTE (OHS): 0.5 K/UL (ref 0.3–1)
ABSOLUTE NEUTROPHIL COUNT (OHS): 4.5 K/UL (ref 1.8–7.7)
ALBUMIN SERPL BCP-MCNC: 4 G/DL (ref 3.5–5.2)
ALP SERPL-CCNC: 99 UNIT/L (ref 40–150)
ALT SERPL W/O P-5'-P-CCNC: 9 UNIT/L (ref 10–44)
ANION GAP (OHS): 16 MMOL/L (ref 8–16)
AST SERPL-CCNC: 51 UNIT/L (ref 11–45)
BASOPHILS # BLD AUTO: 0.03 K/UL
BASOPHILS NFR BLD AUTO: 0.4 %
BILIRUB SERPL-MCNC: 0.6 MG/DL (ref 0.1–1)
BILIRUB UR QL STRIP.AUTO: NEGATIVE
BUN SERPL-MCNC: 34 MG/DL (ref 6–20)
CALCIUM SERPL-MCNC: 10.2 MG/DL (ref 8.7–10.5)
CHLORIDE SERPL-SCNC: 91 MMOL/L (ref 95–110)
CLARITY UR: CLEAR
CO2 SERPL-SCNC: 28 MMOL/L (ref 23–29)
COLOR UR AUTO: YELLOW
CREAT SERPL-MCNC: 1.4 MG/DL (ref 0.5–1.4)
ERYTHROCYTE [DISTWIDTH] IN BLOOD BY AUTOMATED COUNT: 18.5 % (ref 11.5–14.5)
GFR SERPLBLD CREATININE-BSD FMLA CKD-EPI: >60 ML/MIN/1.73/M2
GLUCOSE SERPL-MCNC: 114 MG/DL (ref 70–110)
GLUCOSE UR QL STRIP: NEGATIVE
HCT VFR BLD AUTO: 43.1 % (ref 40–54)
HCV AB SERPL QL IA: NEGATIVE
HGB BLD-MCNC: 14.2 GM/DL (ref 14–18)
HGB UR QL STRIP: NEGATIVE
HIV 1+2 AB+HIV1 P24 AG SERPL QL IA: NEGATIVE
IMM GRANULOCYTES # BLD AUTO: 0.02 K/UL (ref 0–0.04)
IMM GRANULOCYTES NFR BLD AUTO: 0.3 % (ref 0–0.5)
KETONES UR QL STRIP: NEGATIVE
LEUKOCYTE ESTERASE UR QL STRIP: NEGATIVE
LYMPHOCYTES # BLD AUTO: 1.99 K/UL (ref 1–4.8)
MCH RBC QN AUTO: 28.2 PG (ref 27–31)
MCHC RBC AUTO-ENTMCNC: 32.9 G/DL (ref 32–36)
MCV RBC AUTO: 86 FL (ref 82–98)
NITRITE UR QL STRIP: NEGATIVE
NUCLEATED RBC (/100WBC) (OHS): 0 /100 WBC
PH UR STRIP: 6 [PH]
PLATELET # BLD AUTO: 517 K/UL (ref 150–450)
PMV BLD AUTO: 8.9 FL (ref 9.2–12.9)
POTASSIUM SERPL-SCNC: 5.4 MMOL/L (ref 3.5–5.1)
PROT SERPL-MCNC: 10 GM/DL (ref 6–8.4)
PROT UR QL STRIP: ABNORMAL
RBC # BLD AUTO: 5.03 M/UL (ref 4.6–6.2)
RELATIVE EOSINOPHIL (OHS): 0.6 %
RELATIVE LYMPHOCYTE (OHS): 28.1 % (ref 18–48)
RELATIVE MONOCYTE (OHS): 7.1 % (ref 4–15)
RELATIVE NEUTROPHIL (OHS): 63.5 % (ref 38–73)
SODIUM SERPL-SCNC: 135 MMOL/L (ref 136–145)
SP GR UR STRIP: 1.02
UROBILINOGEN UR STRIP-ACNC: NEGATIVE EU/DL
WBC # BLD AUTO: 7.08 K/UL (ref 3.9–12.7)

## 2025-04-03 PROCEDURE — 99284 EMERGENCY DEPT VISIT MOD MDM: CPT | Mod: 25

## 2025-04-03 PROCEDURE — 81003 URINALYSIS AUTO W/O SCOPE: CPT | Performed by: NURSE PRACTITIONER

## 2025-04-03 PROCEDURE — 82040 ASSAY OF SERUM ALBUMIN: CPT | Performed by: NURSE PRACTITIONER

## 2025-04-03 PROCEDURE — 96361 HYDRATE IV INFUSION ADD-ON: CPT

## 2025-04-03 PROCEDURE — 86803 HEPATITIS C AB TEST: CPT | Performed by: EMERGENCY MEDICINE

## 2025-04-03 PROCEDURE — 25000003 PHARM REV CODE 250: Performed by: NURSE PRACTITIONER

## 2025-04-03 PROCEDURE — 25000003 PHARM REV CODE 250: Performed by: EMERGENCY MEDICINE

## 2025-04-03 PROCEDURE — 96374 THER/PROPH/DIAG INJ IV PUSH: CPT

## 2025-04-03 PROCEDURE — 87389 HIV-1 AG W/HIV-1&-2 AB AG IA: CPT | Performed by: EMERGENCY MEDICINE

## 2025-04-03 PROCEDURE — 85025 COMPLETE CBC W/AUTO DIFF WBC: CPT | Performed by: NURSE PRACTITIONER

## 2025-04-03 PROCEDURE — 63600175 PHARM REV CODE 636 W HCPCS: Performed by: NURSE PRACTITIONER

## 2025-04-03 RX ORDER — ONDANSETRON HYDROCHLORIDE 2 MG/ML
4 INJECTION, SOLUTION INTRAVENOUS
Status: COMPLETED | OUTPATIENT
Start: 2025-04-03 | End: 2025-04-03

## 2025-04-03 RX ADMIN — SODIUM CHLORIDE 1000 ML: 9 INJECTION, SOLUTION INTRAVENOUS at 11:04

## 2025-04-03 RX ADMIN — SODIUM CHLORIDE 1000 ML: 9 INJECTION, SOLUTION INTRAVENOUS at 08:04

## 2025-04-03 RX ADMIN — ONDANSETRON 4 MG: 2 INJECTION INTRAMUSCULAR; INTRAVENOUS at 08:04

## 2025-04-04 VITALS
BODY MASS INDEX: 18.62 KG/M2 | OXYGEN SATURATION: 99 % | SYSTOLIC BLOOD PRESSURE: 132 MMHG | DIASTOLIC BLOOD PRESSURE: 79 MMHG | HEART RATE: 73 BPM | WEIGHT: 125.69 LBS | RESPIRATION RATE: 16 BRPM | TEMPERATURE: 98 F | HEIGHT: 69 IN

## 2025-04-04 LAB
ANION GAP (OHS): 11 MMOL/L (ref 8–16)
BUN SERPL-MCNC: 30 MG/DL (ref 6–20)
CALCIUM SERPL-MCNC: 8.8 MG/DL (ref 8.7–10.5)
CHLORIDE SERPL-SCNC: 101 MMOL/L (ref 95–110)
CK SERPL-CCNC: 453 U/L (ref 20–200)
CO2 SERPL-SCNC: 25 MMOL/L (ref 23–29)
CREAT SERPL-MCNC: 1 MG/DL (ref 0.5–1.4)
GFR SERPLBLD CREATININE-BSD FMLA CKD-EPI: >60 ML/MIN/1.73/M2
GLUCOSE SERPL-MCNC: 86 MG/DL (ref 70–110)
POTASSIUM SERPL-SCNC: 4.3 MMOL/L (ref 3.5–5.1)
SODIUM SERPL-SCNC: 137 MMOL/L (ref 136–145)

## 2025-04-04 PROCEDURE — 96361 HYDRATE IV INFUSION ADD-ON: CPT

## 2025-04-04 PROCEDURE — 82550 ASSAY OF CK (CPK): CPT | Performed by: EMERGENCY MEDICINE

## 2025-04-04 PROCEDURE — 82435 ASSAY OF BLOOD CHLORIDE: CPT | Performed by: EMERGENCY MEDICINE

## 2025-04-04 RX ORDER — ONDANSETRON 4 MG/1
4 TABLET, ORALLY DISINTEGRATING ORAL EVERY 8 HOURS PRN
Qty: 15 TABLET | Refills: 0 | Status: SHIPPED | OUTPATIENT
Start: 2025-04-04

## 2025-04-04 NOTE — ED PROVIDER NOTES
SCRIBE #1 NOTE: I, Henry Dhara, am scribing for, and in the presence of, Ton Magallanes DO. I have scribed the entire note.       History     Chief Complaint   Patient presents with    Vomiting     Pt states he's been vomiting for the past 2 days while he goes through detox at [facility]. Pt states there is also something wrong with his left arm after getting an iv removed 2 weeks ago.      Review of patient's allergies indicates:  No Known Allergies      History of Present Illness     HPI    4/3/2025, 11:42 PM  History obtained from the patient      History of Present Illness: Zbigniew Cox is a 44 y.o. male patient with a PMHx of substance abuse who presents to the Emergency Department for evaluation of N/V which onset gradually 2 days. He notes he is going through detox at [facility]. Symptoms are constant and moderate in severity. No mitigating or exacerbating factors reported. Associated sxs include lower abdominal pain. Patient denies any diarrhea and constipation. He notes he took Miralax. No further complaints or concerns at this time.       Arrival mode: Personal vehicle     PCP: Mehreen, Primary Doctor        Past Medical History:  Past Medical History:   Diagnosis Date    Substance abuse        Past Surgical History:  Past Surgical History:   Procedure Laterality Date    FLEXIBLE SIGMOIDOSCOPY N/A 3/24/2025    Procedure: SIGMOIDOSCOPY, FLEXIBLE;  Surgeon: Tasia Kumar MD;  Location: [facility];  Service: Colon and Rectal;  Laterality: N/A;         Family History:  Family History   Problem Relation Name Age of Onset    Stroke Maternal Uncle      Cancer Maternal Grandfather         Social History:  Social History     Tobacco Use    Smoking status: Every Day     Current packs/day: 1.00     Average packs/day: 1 pack/day for 10.0 years (10.0 ttl pk-yrs)     Types: Cigarettes    Smokeless tobacco: Never   Substance and Sexual Activity    Alcohol use: Yes     Comment: social    Drug use: Yes     Types:  "Marijuana    Sexual activity: Yes     Partners: Female        Review of Systems     Review of Systems   Gastrointestinal:  Positive for abdominal pain (lower), nausea and vomiting. Negative for constipation and diarrhea.        Physical Exam     Initial Vitals [04/03/25 1858]   BP Pulse Resp Temp SpO2   (!) 145/93 89 16 97.8 °F (36.6 °C) 97 %      MAP       --          Physical Exam  Constitutional:       General: He is not in acute distress.     Appearance: Normal appearance.   Cardiovascular:      Rate and Rhythm: Normal rate and regular rhythm.      Heart sounds: No murmur heard.  Abdominal:      Palpations: Abdomen is soft.      Comments: Bilateral lower quadrant tenderness to palpation with no rigidity and no distention   Musculoskeletal:         General: Normal range of motion.   Skin:     General: Skin is warm and dry.      Findings: No rash.   Neurological:      General: No focal deficit present.      Mental Status: He is alert and oriented to person, place, and time.               ED Course   Procedures  ED Vital Signs:  Vitals:    04/03/25 1858 04/03/25 2250 04/03/25 2316 04/03/25 2317   BP: (!) 145/93 109/80 (!) 155/99 (!) 145/102   Pulse: 89 78 68 71   Resp: 16 16     Temp: 97.8 °F (36.6 °C)      TempSrc: Oral      SpO2: 97% 99% 96% (!) 94%   Weight: 57 kg (125 lb 10.6 oz)      Height: 5' 9" (1.753 m)       04/04/25 0202 04/04/25 0248   BP: 117/80 132/79   Pulse: 71 73   Resp:     Temp:     TempSrc:     SpO2: 100% 99%   Weight:     Height:         Abnormal Lab Results:  Labs Reviewed   COMPREHENSIVE METABOLIC PANEL - Abnormal       Result Value    Sodium 135 (*)     Potassium 5.4 (*)     Chloride 91 (*)     CO2 28      Glucose 114 (*)     BUN 34 (*)     Creatinine 1.4      Calcium 10.2      Protein Total 10.0 (*)     Albumin 4.0      Bilirubin Total 0.6      ALP 99      AST 51 (*)     ALT 9 (*)     Anion Gap 16      eGFR >60     URINALYSIS, REFLEX TO URINE CULTURE - Abnormal    Color, UA Yellow      " Appearance, UA Clear      pH, UA 6.0      Spec Grav UA 1.025      Protein, UA Trace (*)     Glucose, UA Negative      Ketones, UA Negative      Bilirubin, UA Negative      Blood, UA Negative      Nitrites, UA Negative      Urobilinogen, UA Negative      Leukocyte Esterase, UA Negative     CBC WITH DIFFERENTIAL - Abnormal    WBC 7.08      RBC 5.03      HGB 14.2      HCT 43.1      MCV 86      MCH 28.2      MCHC 32.9      RDW 18.5 (*)     Platelet Count 517 (*)     MPV 8.9 (*)     Nucleated RBC 0      Neut % 63.5      Lymph % 28.1      Mono % 7.1      Eos % 0.6      Basophil % 0.4      Imm Grans % 0.3      Neut # 4.50      Lymph # 1.99      Mono # 0.50      Eos # 0.04      Baso # 0.03      Imm Grans # 0.02     CK - Abnormal     (*)    BASIC METABOLIC PANEL - Abnormal    Sodium 137      Potassium 4.3      Chloride 101      CO2 25      Glucose 86      BUN 30 (*)     Creatinine 1.0      Calcium 8.8      Anion Gap 11      eGFR >60     HEPATITIS C ANTIBODY - Normal    Hep C Ab Interp Negative     HIV 1 / 2 ANTIBODY - Normal    HIV 1/2 Ag/Ab Negative     CBC W/ AUTO DIFFERENTIAL    Narrative:     The following orders were created for panel order CBC auto differential.  Procedure                               Abnormality         Status                     ---------                               -----------         ------                     CBC with Differential[[id number]]       Abnormal            Final result                 Please view results for these tests on the individual orders.   HEP C VIRUS HOLD SPECIMEN        All Lab Results:  Results for orders placed or performed during the hospital encounter of 04/03/25   Hepatitis C Antibody    Collection Time: 04/03/25  7:58 PM   Result Value Ref Range    Hep C Ab Interp Negative Negative   HIV 1/2 Ag/Ab (4th Gen)    Collection Time: 04/03/25  7:58 PM   Result Value Ref Range    HIV 1/2 Ag/Ab Negative Negative   Comprehensive metabolic panel    Collection Time:  04/03/25  7:58 PM   Result Value Ref Range    Sodium 135 (L) 136 - 145 mmol/L    Potassium 5.4 (H) 3.5 - 5.1 mmol/L    Chloride 91 (L) 95 - 110 mmol/L    CO2 28 23 - 29 mmol/L    Glucose 114 (H) 70 - 110 mg/dL    BUN 34 (H) 6 - 20 mg/dL    Creatinine 1.4 0.5 - 1.4 mg/dL    Calcium 10.2 8.7 - 10.5 mg/dL    Protein Total 10.0 (H) 6.0 - 8.4 gm/dL    Albumin 4.0 3.5 - 5.2 g/dL    Bilirubin Total 0.6 0.1 - 1.0 mg/dL    ALP 99 40 - 150 unit/L    AST 51 (H) 11 - 45 unit/L    ALT 9 (L) 10 - 44 unit/L    Anion Gap 16 8 - 16 mmol/L    eGFR >60 >60 mL/min/1.73/m2   CBC with Differential    Collection Time: 04/03/25  7:58 PM   Result Value Ref Range    WBC 7.08 3.90 - 12.70 K/uL    RBC 5.03 4.60 - 6.20 M/uL    HGB 14.2 14.0 - 18.0 gm/dL    HCT 43.1 40.0 - 54.0 %    MCV 86 82 - 98 fL    MCH 28.2 27.0 - 31.0 pg    MCHC 32.9 32.0 - 36.0 g/dL    RDW 18.5 (H) 11.5 - 14.5 %    Platelet Count 517 (H) 150 - 450 K/uL    MPV 8.9 (L) 9.2 - 12.9 fL    Nucleated RBC 0 <=0 /100 WBC    Neut % 63.5 38 - 73 %    Lymph % 28.1 18 - 48 %    Mono % 7.1 4 - 15 %    Eos % 0.6 <=8 %    Basophil % 0.4 <=1.9 %    Imm Grans % 0.3 0.0 - 0.5 %    Neut # 4.50 1.8 - 7.7 K/uL    Lymph # 1.99 1 - 4.8 K/uL    Mono # 0.50 0.3 - 1 K/uL    Eos # 0.04 <=0.5 K/uL    Baso # 0.03 <=0.2 K/uL    Imm Grans # 0.02 0.00 - 0.04 K/uL   Urinalysis, Reflex to Urine Culture Urine, Clean Catch    Collection Time: 04/03/25  9:47 PM    Specimen: Urine   Result Value Ref Range    Color, UA Yellow Straw, Argelia, Yellow, Light-Orange    Appearance, UA Clear Clear    pH, UA 6.0 5.0 - 8.0    Spec Grav UA 1.025 1.005 - 1.030    Protein, UA Trace (A) Negative    Glucose, UA Negative Negative    Ketones, UA Negative Negative    Bilirubin, UA Negative Negative    Blood, UA Negative Negative    Nitrites, UA Negative Negative    Urobilinogen, UA Negative <2.0 EU/dL    Leukocyte Esterase, UA Negative Negative   CPK    Collection Time: 04/04/25 12:40 AM   Result Value Ref Range     (H)  20 - 200 U/L   Basic metabolic panel    Collection Time: 04/04/25  1:44 AM   Result Value Ref Range    Sodium 137 136 - 145 mmol/L    Potassium 4.3 3.5 - 5.1 mmol/L    Chloride 101 95 - 110 mmol/L    CO2 25 23 - 29 mmol/L    Glucose 86 70 - 110 mg/dL    BUN 30 (H) 6 - 20 mg/dL    Creatinine 1.0 0.5 - 1.4 mg/dL    Calcium 8.8 8.7 - 10.5 mg/dL    Anion Gap 11 8 - 16 mmol/L    eGFR >60 >60 mL/min/1.73/m2         Imaging Results:  Imaging Results    None          The Emergency Provider reviewed the vital signs and test results, which are outlined above.     ED Discussion       2:52 AM: Reassessed pt at this time. Discussed with patient and/or family/caretaker all pertinent ED information and results. Discussed pt dx and plan of tx. Gave the patient all f/u and return to the ED instructions. All questions and concerns were addressed at this time. Patient and/or family/caretaker expresses understanding of information and instructions, and is comfortable with plan to discharge. Pt is stable for discharge.     I discussed with patient and/or family/caretaker that evaluation in the ED does not suggest any emergent or life threatening medical conditions requiring immediate intervention beyond what was provided in the ED, and I believe patient is safe for discharge.  Regardless, an unremarkable evaluation in the ED does not preclude the development or presence of a serious of life threatening condition. As such, I instructed that the patient is to return immediately for any worsening or change in current symptoms.     ED Course as of 04/09/25 1338   Fri Apr 04, 2025   0242 CPK(!)  Mild elevation [CD]   0242 Urinalysis, Reflex to Urine Culture Urine, Clean Catch(!)  No UTI [CD]   0242 Comprehensive metabolic panel(!)  Mild hyperkalemia with other nonspecific findings [CD]   0242 CBC auto differential(!)  Nonspecific findings [CD]   0242 Hepatitis C Antibody  Negative [CD]   0242 HIV 1/2 Ag/Ab (4th Gen)  Negative [CD]   0242  Basic metabolic panel(!)  Electrolyte improvement, nonspecific findings [CD]      ED Course User Index  [CD] Ton Magallanes DO     Medical Decision Making  After IV fluids patient's kidney function and hyperkalemia have resolved.  He states that his abdominal pain has resolved.  He is able to tolerate fluids at the time of discharge.  He was instructed to return immediately for any new or worsening symptoms and he verbalized understanding.    Amount and/or Complexity of Data Reviewed  Labs: ordered. Decision-making details documented in ED Course.    Risk  Prescription drug management.  Risk Details: Differential diagnosis includes but is not limited to:  Dehydration, adverse reaction to medication, electrolyte abnormality, UTI, appendicitis, diverticulitis, ureteral stone, gastroenteritis                 ED Medication(s):  Medications   sodium chloride 0.9% bolus 1,000 mL 1,000 mL (0 mLs Intravenous Stopped 4/3/25 2128)   ondansetron injection 4 mg (4 mg Intravenous Given 4/3/25 2017)   sodium chloride 0.9% bolus 1,000 mL 1,000 mL (0 mLs Intravenous Stopped 4/4/25 0150)       Discharge Medication List as of 4/4/2025  2:45 AM        START taking these medications    Details   ondansetron (ZOFRAN-ODT) 4 MG TbDL Take 1 tablet (4 mg total) by mouth every 8 (eight) hours as needed (Nausea/vomiting)., Starting Fri 4/4/2025, Print              Follow-up Information       Schedule an appointment as soon as possible [facility] a [facility]  [facility]    Contact information:  [address]  [place] [address] [phone]                                 Scribe Attestation:   Scribe #1: I performed the above scribed service and the documentation accurately describes the services I performed. I attest to the accuracy of the note.     Attending:   Physician Attestation Statement for Scribe #1: I, Ton Magallanes DO, personally performed the services described in this documentation, as scribed by  Henry Jules and Derick Burns, in my presence, and it is both accurate and complete.           Clinical Impression       ICD-10-CM ICD-9-CM   1. Nausea and vomiting, unspecified vomiting type  R11.2 787.01       Disposition:   Disposition: Discharged  Condition: Stable       Ton Magallanes DO  04/09/25 1342

## 2025-04-04 NOTE — FIRST PROVIDER EVALUATION
"Medical screening examination initiated.  I have conducted a focused provider triage encounter, findings are as follows:    Brief history of present illness:  reports he feels dehydrated with n/v. Also reports left arm weakness x 2 weeks     Vitals:    04/03/25 1858   BP: (!) 145/93   BP Location: Right arm   Pulse: 89   Resp: 16   Temp: 97.8 °F (36.6 °C)   TempSrc: Oral   SpO2: 97%   Weight: 57 kg (125 lb 10.6 oz)   Height: 5' 9" (1.753 m)       Pertinent physical exam:  nad    Brief workup plan:  labs, meds, further eval    Preliminary workup initiated; this workup will be continued and followed by the physician or advanced practice provider that is assigned to the patient when roomed.  "

## (undated) DEVICE — GOWN POLY REINF BRTH SLV XL

## (undated) DEVICE — GLOVE SENSICARE PI GRN 7

## (undated) DEVICE — PACK BASIC SETUP SC BR

## (undated) DEVICE — MANIFOLD 4 PORT

## (undated) DEVICE — DRAPE T CYSTOSCOPY STERILE

## (undated) DEVICE — BRIEF MESH LARGE

## (undated) DEVICE — JELLY SURGILUBE LUBE PKT 3GM

## (undated) DEVICE — GLOVE SENSICARE PI ALOE 6.5

## (undated) DEVICE — COVER LIGHT HANDLE 80/CA

## (undated) DEVICE — PAD ABDOMINAL STERILE 8X10IN